# Patient Record
Sex: FEMALE | Race: WHITE | NOT HISPANIC OR LATINO | Employment: OTHER | URBAN - METROPOLITAN AREA
[De-identification: names, ages, dates, MRNs, and addresses within clinical notes are randomized per-mention and may not be internally consistent; named-entity substitution may affect disease eponyms.]

---

## 2017-01-19 RX ORDER — SIMVASTATIN 40 MG/1
TABLET, FILM COATED ORAL
Qty: 30 TABLET | Refills: 5 | Status: SHIPPED | OUTPATIENT
Start: 2017-01-19 | End: 2017-07-06 | Stop reason: SDUPTHER

## 2017-02-14 ENCOUNTER — CLINICAL SUPPORT (OUTPATIENT)
Dept: INTERNAL MEDICINE | Facility: CLINIC | Age: 82
End: 2017-02-14
Payer: MEDICARE

## 2017-02-14 DIAGNOSIS — E78.5 HYPERLIPIDEMIA, UNSPECIFIED HYPERLIPIDEMIA TYPE: ICD-10-CM

## 2017-02-14 DIAGNOSIS — I10 ESSENTIAL HYPERTENSION: ICD-10-CM

## 2017-02-14 DIAGNOSIS — F41.9 ANXIETY DISORDER, UNSPECIFIED TYPE: ICD-10-CM

## 2017-02-14 DIAGNOSIS — E03.4 HYPOTHYROIDISM DUE TO ACQUIRED ATROPHY OF THYROID: ICD-10-CM

## 2017-02-14 DIAGNOSIS — F51.04 CHRONIC INSOMNIA: ICD-10-CM

## 2017-02-14 LAB
ALBUMIN SERPL BCP-MCNC: 3.2 G/DL
ALP SERPL-CCNC: 45 U/L
ALT SERPL W/O P-5'-P-CCNC: 16 U/L
ANION GAP SERPL CALC-SCNC: 7 MMOL/L
AST SERPL-CCNC: 21 U/L
BASOPHILS # BLD AUTO: 0.03 K/UL
BASOPHILS NFR BLD: 0.4 %
BILIRUB SERPL-MCNC: 0.8 MG/DL
BUN SERPL-MCNC: 27 MG/DL
CALCIUM SERPL-MCNC: 9.7 MG/DL
CHLORIDE SERPL-SCNC: 107 MMOL/L
CHOLEST/HDLC SERPL: 2.6 {RATIO}
CO2 SERPL-SCNC: 30 MMOL/L
CREAT SERPL-MCNC: 1.3 MG/DL
DIFFERENTIAL METHOD: ABNORMAL
EOSINOPHIL # BLD AUTO: 0.2 K/UL
EOSINOPHIL NFR BLD: 2.8 %
ERYTHROCYTE [DISTWIDTH] IN BLOOD BY AUTOMATED COUNT: 13.1 %
EST. GFR  (AFRICAN AMERICAN): 42 ML/MIN/1.73 M^2
EST. GFR  (NON AFRICAN AMERICAN): 37 ML/MIN/1.73 M^2
GLUCOSE SERPL-MCNC: 92 MG/DL
HCT VFR BLD AUTO: 36.6 %
HDL/CHOLESTEROL RATIO: 39 %
HDLC SERPL-MCNC: 123 MG/DL
HDLC SERPL-MCNC: 48 MG/DL
HGB BLD-MCNC: 12 G/DL
LDLC SERPL CALC-MCNC: 60.6 MG/DL
LYMPHOCYTES # BLD AUTO: 1.8 K/UL
LYMPHOCYTES NFR BLD: 22.5 %
MCH RBC QN AUTO: 32.3 PG
MCHC RBC AUTO-ENTMCNC: 32.8 %
MCV RBC AUTO: 98 FL
MONOCYTES # BLD AUTO: 0.8 K/UL
MONOCYTES NFR BLD: 9.6 %
NEUTROPHILS # BLD AUTO: 5 K/UL
NEUTROPHILS NFR BLD: 64.7 %
NONHDLC SERPL-MCNC: 75 MG/DL
PLATELET # BLD AUTO: 216 K/UL
PMV BLD AUTO: 9.4 FL
POTASSIUM SERPL-SCNC: 4 MMOL/L
PROT SERPL-MCNC: 6.8 G/DL
RBC # BLD AUTO: 3.72 M/UL
SODIUM SERPL-SCNC: 144 MMOL/L
TRIGL SERPL-MCNC: 72 MG/DL
TSH SERPL DL<=0.005 MIU/L-ACNC: 1.71 UIU/ML
WBC # BLD AUTO: 7.79 K/UL

## 2017-02-14 PROCEDURE — 84443 ASSAY THYROID STIM HORMONE: CPT

## 2017-02-14 PROCEDURE — 80053 COMPREHEN METABOLIC PANEL: CPT

## 2017-02-14 PROCEDURE — 36415 COLL VENOUS BLD VENIPUNCTURE: CPT | Mod: PBBFAC

## 2017-02-14 PROCEDURE — 85025 COMPLETE CBC W/AUTO DIFF WBC: CPT

## 2017-02-14 PROCEDURE — 80061 LIPID PANEL: CPT

## 2017-02-17 RX ORDER — LISINOPRIL 10 MG/1
TABLET ORAL
Qty: 60 TABLET | Refills: 5 | Status: SHIPPED | OUTPATIENT
Start: 2017-02-17 | End: 2019-03-20 | Stop reason: SDUPTHER

## 2017-03-02 ENCOUNTER — OFFICE VISIT (OUTPATIENT)
Dept: INTERNAL MEDICINE | Facility: CLINIC | Age: 82
End: 2017-03-02
Payer: MEDICARE

## 2017-03-02 VITALS
SYSTOLIC BLOOD PRESSURE: 126 MMHG | HEIGHT: 60 IN | RESPIRATION RATE: 16 BRPM | HEART RATE: 64 BPM | OXYGEN SATURATION: 97 % | WEIGHT: 156.75 LBS | BODY MASS INDEX: 30.77 KG/M2 | DIASTOLIC BLOOD PRESSURE: 64 MMHG

## 2017-03-02 DIAGNOSIS — I10 ESSENTIAL HYPERTENSION: Primary | ICD-10-CM

## 2017-03-02 DIAGNOSIS — F51.04 CHRONIC INSOMNIA: ICD-10-CM

## 2017-03-02 DIAGNOSIS — F41.9 ANXIETY DISORDER, UNSPECIFIED TYPE: ICD-10-CM

## 2017-03-02 DIAGNOSIS — E78.5 HYPERLIPIDEMIA, UNSPECIFIED HYPERLIPIDEMIA TYPE: ICD-10-CM

## 2017-03-02 DIAGNOSIS — E03.4 HYPOTHYROIDISM DUE TO ACQUIRED ATROPHY OF THYROID: ICD-10-CM

## 2017-03-02 PROCEDURE — 99214 OFFICE O/P EST MOD 30 MIN: CPT | Mod: S$PBB | Performed by: INTERNAL MEDICINE

## 2017-03-02 PROCEDURE — 99999 PR PBB SHADOW E&M-EST. PATIENT-LVL III: CPT | Mod: PBBFAC,,, | Performed by: INTERNAL MEDICINE

## 2017-03-02 PROCEDURE — 1160F RVW MEDS BY RX/DR IN RCRD: CPT | Performed by: INTERNAL MEDICINE

## 2017-03-02 PROCEDURE — 1157F ADVNC CARE PLAN IN RCRD: CPT | Performed by: INTERNAL MEDICINE

## 2017-03-02 PROCEDURE — 99213 OFFICE O/P EST LOW 20 MIN: CPT | Mod: PBBFAC | Performed by: INTERNAL MEDICINE

## 2017-03-02 PROCEDURE — 1159F MED LIST DOCD IN RCRD: CPT | Performed by: INTERNAL MEDICINE

## 2017-03-02 RX ORDER — AMLODIPINE BESYLATE 2.5 MG/1
1 TABLET ORAL DAILY
COMMUNITY
Start: 2017-02-10 | End: 2018-09-19 | Stop reason: SDUPTHER

## 2017-03-02 NOTE — PROGRESS NOTES
Subjective:       Patient ID: Chanda Kaufman is a 88 y.o. female.    Chief Complaint: Thyroid Problem (Routine 6 month f/u with lab review); Hypertension; Hyperlipidemia; and Anxiety    HPI Comments: Chanda Kaufman is a 88 y.o. female who presents for thyroid issues , Hypertension, and Hyperlipidemia follow up. Labs were reviewed with patient today.    Thyroid Problem   Symptoms include anxiety. Patient reports no palpitations. Her past medical history is significant for hyperlipidemia.   Hypertension   This is a chronic problem. The problem is controlled. Associated symptoms include anxiety. Pertinent negatives include no chest pain, neck pain, palpitations or shortness of breath. Past treatments include diuretics, ACE inhibitors and beta blockers. The current treatment provides moderate improvement. Hypertensive end-organ damage includes a thyroid problem.   Hyperlipidemia   This is a chronic problem. The problem is controlled. Recent lipid tests were reviewed and are low. Exacerbating diseases include hypothyroidism and obesity. Pertinent negatives include no chest pain, myalgias or shortness of breath. Current antihyperlipidemic treatment includes statins. The current treatment provides significant improvement of lipids.   Anxiety   Presents for follow-up visit. Symptoms include excessive worry, insomnia, muscle tension, nervous/anxious behavior and restlessness. Patient reports no chest pain, confusion, dizziness, nausea, palpitations, shortness of breath or suicidal ideas. Symptoms occur most days. The severity of symptoms is severe.       Medication Refill   Associated symptoms include arthralgias and joint swelling. Pertinent negatives include no chest pain, chills, congestion, coughing, fever, myalgias, nausea, neck pain, numbness, sore throat or vomiting.     Review of Systems   Constitutional: Negative for chills and fever.   HENT: Negative for congestion, hearing loss, sinus pressure and sore throat.     Eyes: Negative for photophobia.   Respiratory: Negative for cough, choking, chest tightness, shortness of breath and wheezing.    Cardiovascular: Negative for chest pain and palpitations.   Gastrointestinal: Negative for blood in stool, nausea and vomiting.   Genitourinary: Negative for dysuria and hematuria.   Musculoskeletal: Positive for arthralgias, gait problem and joint swelling. Negative for myalgias and neck pain.   Skin: Negative for pallor.   Neurological: Negative for dizziness and numbness.   Hematological: Does not bruise/bleed easily.   Psychiatric/Behavioral: Positive for sleep disturbance. Negative for confusion and suicidal ideas. The patient is nervous/anxious and has insomnia.        Objective:      Physical Exam   Constitutional: She is oriented to person, place, and time. She appears well-developed and well-nourished.   HENT:   Head: Normocephalic and atraumatic.   Right Ear: External ear normal.   Left Ear: External ear normal.   Mouth/Throat: Oropharynx is clear and moist.   Eyes: Conjunctivae and EOM are normal. Pupils are equal, round, and reactive to light.   Neck: Normal range of motion. Neck supple. No JVD present. No tracheal deviation present. No thyromegaly present.   Cardiovascular: Normal rate, regular rhythm, normal heart sounds and intact distal pulses.    Pulmonary/Chest: Effort normal and breath sounds normal. No respiratory distress. She has no wheezes. She has no rales. She exhibits no tenderness.   Abdominal: Soft. Bowel sounds are normal. She exhibits no distension and no mass. There is no tenderness. There is no rebound and no guarding.   Musculoskeletal: Normal range of motion. She exhibits no edema.   Lymphadenopathy:     She has no cervical adenopathy.   Neurological: She is alert and oriented to person, place, and time. She has normal reflexes. No cranial nerve deficit. She exhibits normal muscle tone. Coordination normal.   Skin: Skin is warm and dry.   Psychiatric:  She has a normal mood and affect.   Nursing note and vitals reviewed.      Assessment:       1. Essential hypertension    2. Hypothyroidism due to acquired atrophy of thyroid    3. Hyperlipidemia, unspecified hyperlipidemia type    4. Anxiety disorder, unspecified type    5. Chronic insomnia        Plan:   Chanda was seen today for thyroid problem, hypertension, hyperlipidemia and anxiety.    Diagnoses and all orders for this visit:    Essential hypertension  -     CBC auto differential; Future  -     Comprehensive metabolic panel; Future    Well controlled.  Continue same medication and dose.  1. Keep weight close to ideal body weight.   2.   Avoid high salt foods (olives, pickles, smoked meats, salted potato chips, etc.).   Do not add salt to your food at the table.   Use only small amounts of salt when cooking.   3. Begin an exercise program. Discuss with your doctor what type of exercise program would be best for you. It doesn't have to be difficult. Even brisk walking for 20 minutes three times a week is a good form of exercise.   4. Avoid medicines which contain heart stimulants. This includes many cold and sinus decongestant pills and sprays as well as diet pills. Check the warnings about hypertension on the label. Stimulants such as amphetamine or cocaine could be lethal for someone with hypertension. Never take these.    Hypothyroidism due to acquired atrophy of thyroid  -     TSH; Future  Well controlled.  Continue same medication and dose.  Hyperlipidemia, unspecified hyperlipidemia type  -     Lipid panel; Future  -     TSH; Future  Well controlled.  Continue same medication and dose.  Anxiety disorder, unspecified type  -     TSH; Future  stable on xanax    Chronic insomnia  -     TSH; Future

## 2017-03-02 NOTE — MR AVS SNAPSHOT
[facility]  [address] [facility] [address]  Phone: [phone]  Fax: [phone]                  Chanda Kaufman   3/2/2017 11:15 AM   Office Visit    Description:  Female : 1928   Provider:  Lucina Carter MD   Department:  [facility]           Reason for Visit     Thyroid Problem     Hypertension     Hyperlipidemia     Anxiety           Diagnoses this Visit        Comments    Essential hypertension    -  Primary     Hypothyroidism due to acquired atrophy of thyroid         Hyperlipidemia, unspecified hyperlipidemia type         Anxiety disorder, unspecified type         Chronic insomnia                To Do List           Future Appointments        Provider Department Dept Phone    3/2/2017 11:15 AM Lucina Carter MD [facility] [phone]      Goals (5 Years of Data)     None      Follow-Up and Disposition     Return in about 6 months (around 2017).      [facility]     [facility] -  Assistance  Registered nurses in the [facility] Center provide clinical advisement, health education, appointment booking, and other advisory services.  Call for this free service at [phone].             Medications           Message regarding Medications     Verify the changes and/or additions to your medication regime listed below are the same as discussed with your clinician today.  If any of these changes or additions are incorrect, please notify your healthcare provider.             Verify that the below list of medications is an accurate representation of the medications you are currently taking.  If none reported, the list may be blank. If incorrect, please contact your healthcare provider. Carry this list with you in case of emergency.           Current Medications     alprazolam (XANAX) 0.5 MG tablet Take 1 tablet (0.5 mg total) by mouth 2 (two) times daily as needed.    amlodipine (NORVASC) 2.5 MG  tablet Take 1 tablet by mouth once daily.    cilostazol (PLETAL) 100 MG Tab 100 mg 2 (two) times daily.     clopidogrel (PLAVIX) 75 mg tablet Take 75 mg by mouth once daily.     doxazosin (CARDURA) 4 MG tablet Take 4 mg by mouth once daily.    gabapentin (NEURONTIN) 300 MG capsule Take 300 mg by mouth once daily.     hydrochlorothiazide (HYDRODIURIL) 25 MG tablet Take 1 tablet (25 mg total) by mouth once daily.    levothyroxine (SYNTHROID) 50 MCG tablet Take 1 tablet (50 mcg total) by mouth once daily.    lisinopril 10 MG tablet TAKE ONE TABLET BY MOUTH TWICE A DAY    meclizine (ANTIVERT) 25 mg tablet Take 25 mg by mouth 3 (three) times daily as needed.    metoprolol tartrate (LOPRESSOR) 100 MG tablet TAKE ONE TABLET BY MOUTH TWICE A DAY    MV-MIN/FA/D3/OM-3/DHA/EPA/FISH (CARDIAMIN ORAL) Take 1 tablet by mouth once daily.      POLY-IRON 150 FORTE 150-25-1 mg-mcg-mg Cap TAKE ONE CAPSULE BY MOUTH EVERY DAY    simvastatin (ZOCOR) 40 MG tablet TAKE ONE TABLET BY MOUTH AT BEDTIME           Clinical Reference Information           Your Vitals Were     BP                   126/64           Blood Pressure          Most Recent Value    BP  126/64      Allergies as of 3/2/2017     Penicillins      Immunizations Administered on Date of Encounter - 3/2/2017     None      Orders Placed During Today's Visit     Future Labs/Procedures Expected by Expires    CBC auto differential  8/29/2017 (Approximate) 3/2/2018    Comprehensive metabolic panel  8/29/2017 (Approximate) 3/2/2018    Lipid panel  8/29/2017 (Approximate) 3/2/2018    TSH  8/29/2017 (Approximate) 3/2/2018      [facility] Sign-Up     Activating your [facility] account is as easy as 1-2-3!     1) Visit [email or web address], select Sign Up Now, enter this activation code and your date of birth, then select Next.  Activation code not generated  Current Patient Portal Status: Account disabled      2) Create a username and password to use when you visit [facility] in the future and  select a security question in case you lose your password and select Next.    3) Enter your e-mail address and click Sign Up!    Additional Information  If you have questions, please e-mail [email or web address] or call [phone] to talk to our [facility] staff. Remember, [facility] is NOT to be used for urgent needs. For medical emergencies, dial 911.         Language Assistance Services     ATTENTION: Language assistance services are available, free of charge. Please call [phone].      ATENCIÓN: Si habla español, tiene a mosquera disposición servicios gratuitos de asistencia lingüística. Llame al [phone].     CHÚ Ý: N?u b?n nói Ti?ng Vi?t, có các d?ch v? h? tr? ngôn ng? mi?n phí dành cho b?n. G?i s? [phone].         [facility] complies with applicable Federal civil rights laws and does not discriminate on the basis of race, color, national origin, age, disability, or sex.

## 2017-06-12 ENCOUNTER — TELEPHONE (OUTPATIENT)
Dept: INTERNAL MEDICINE | Facility: CLINIC | Age: 82
End: 2017-06-12

## 2017-06-12 RX ORDER — ALPRAZOLAM 0.5 MG/1
0.5 TABLET ORAL 2 TIMES DAILY PRN
Qty: 60 TABLET | Refills: 5 | Status: SHIPPED | OUTPATIENT
Start: 2017-06-12 | End: 2017-12-05 | Stop reason: SDUPTHER

## 2017-07-10 RX ORDER — SIMVASTATIN 40 MG/1
TABLET, FILM COATED ORAL
Qty: 30 TABLET | Refills: 5 | Status: SHIPPED | OUTPATIENT
Start: 2017-07-10 | End: 2018-01-03 | Stop reason: SDUPTHER

## 2017-08-07 DIAGNOSIS — E03.4 HYPOTHYROIDISM DUE TO ACQUIRED ATROPHY OF THYROID: ICD-10-CM

## 2017-08-09 ENCOUNTER — TELEPHONE (OUTPATIENT)
Dept: INTERNAL MEDICINE | Facility: CLINIC | Age: 82
End: 2017-08-09

## 2017-08-09 RX ORDER — LEVOTHYROXINE SODIUM 50 UG/1
TABLET ORAL
Qty: 30 TABLET | Refills: 11 | Status: SHIPPED | OUTPATIENT
Start: 2017-08-09 | End: 2018-07-31 | Stop reason: SDUPTHER

## 2017-08-09 RX ORDER — METOPROLOL TARTRATE 100 MG/1
TABLET ORAL
Qty: 60 TABLET | Refills: 5 | Status: SHIPPED | OUTPATIENT
Start: 2017-08-09 | End: 2018-02-01 | Stop reason: SDUPTHER

## 2017-08-22 ENCOUNTER — TELEPHONE (OUTPATIENT)
Dept: INTERNAL MEDICINE | Facility: CLINIC | Age: 82
End: 2017-08-22

## 2017-08-22 RX ORDER — TORSEMIDE 5 MG/1
10 TABLET ORAL DAILY
COMMUNITY
End: 2018-09-19 | Stop reason: SDUPTHER

## 2017-08-22 NOTE — TELEPHONE ENCOUNTER
Lab results dated 8/8/17 received from Dr. Monterroso & reviewed per Dr. Carter (scanned into media). Orders per Dr. Carter to d/c HCTZ and repeat BMP in 1 week. Spoke to patient's granddaughter, Edie who states that Dr. Monterroso has already addressed these labs. HCTZ has already been d/c'ed and Torsemide 5 mg daily has been started. Patient's lisinopril has been decreased to 5 mg daily. Patient had labs repeated today per Dr. Monterroso.

## 2017-09-12 ENCOUNTER — OFFICE VISIT (OUTPATIENT)
Dept: INTERNAL MEDICINE | Facility: CLINIC | Age: 82
End: 2017-09-12
Payer: MEDICARE

## 2017-09-12 VITALS
WEIGHT: 153.44 LBS | HEART RATE: 69 BPM | BODY MASS INDEX: 30.12 KG/M2 | SYSTOLIC BLOOD PRESSURE: 132 MMHG | DIASTOLIC BLOOD PRESSURE: 80 MMHG | OXYGEN SATURATION: 94 % | HEIGHT: 60 IN | RESPIRATION RATE: 14 BRPM

## 2017-09-12 DIAGNOSIS — F41.1 GENERALIZED ANXIETY DISORDER: ICD-10-CM

## 2017-09-12 DIAGNOSIS — F51.04 CHRONIC INSOMNIA: ICD-10-CM

## 2017-09-12 DIAGNOSIS — I10 ESSENTIAL HYPERTENSION: Primary | ICD-10-CM

## 2017-09-12 DIAGNOSIS — E03.4 HYPOTHYROIDISM DUE TO ACQUIRED ATROPHY OF THYROID: ICD-10-CM

## 2017-09-12 DIAGNOSIS — E78.2 MIXED HYPERLIPIDEMIA: ICD-10-CM

## 2017-09-12 PROCEDURE — 99214 OFFICE O/P EST MOD 30 MIN: CPT | Mod: S$PBB | Performed by: INTERNAL MEDICINE

## 2017-09-12 PROCEDURE — 99999 PR PBB SHADOW E&M-EST. PATIENT-LVL III: CPT | Mod: PBBFAC,,, | Performed by: INTERNAL MEDICINE

## 2017-09-12 PROCEDURE — 99999 PR STA SHADOW: CPT | Mod: PBBFAC,,, | Performed by: INTERNAL MEDICINE

## 2017-09-12 PROCEDURE — 99213 OFFICE O/P EST LOW 20 MIN: CPT | Mod: PBBFAC | Performed by: INTERNAL MEDICINE

## 2017-09-12 RX ORDER — ASPIRIN 81 MG/1
81 TABLET ORAL DAILY
Status: ON HOLD | COMMUNITY
End: 2019-01-01 | Stop reason: HOSPADM

## 2017-09-12 NOTE — PROGRESS NOTES
Subjective:       Patient ID: Chanda Kaufman is a 89 y.o. female.    Chief Complaint: Hyperlipidemia (follow up  (labs in Media)); Hypertension; Thyroid Problem; and Anxiety    Chanda Kaufman is a 89 y.o. female who presents for thyroid issues , Hypertension, and Hyperlipidemia follow up. Labs were reviewed with patient today.      She had labs done per DR Monterroso;  Her creatinine bumped; so her meds are changed.      Hyperlipidemia   This is a chronic problem. The problem is controlled. Recent lipid tests were reviewed and are low. Exacerbating diseases include hypothyroidism and obesity. Pertinent negatives include no chest pain, myalgias or shortness of breath. Current antihyperlipidemic treatment includes statins. The current treatment provides significant improvement of lipids.   Hypertension   This is a chronic problem. The problem is controlled. Associated symptoms include anxiety. Pertinent negatives include no chest pain, neck pain, palpitations or shortness of breath. Past treatments include diuretics, ACE inhibitors and beta blockers. The current treatment provides moderate improvement. Hypertensive end-organ damage includes a thyroid problem.   Thyroid Problem   Symptoms include anxiety. Patient reports no palpitations. Her past medical history is significant for hyperlipidemia.   Anxiety   Presents for follow-up visit. Symptoms include excessive worry, insomnia, muscle tension, nervous/anxious behavior and restlessness. Patient reports no chest pain, confusion, dizziness, nausea, palpitations, shortness of breath or suicidal ideas. Symptoms occur most days. The severity of symptoms is severe.       Medication Refill   Associated symptoms include arthralgias and joint swelling. Pertinent negatives include no chest pain, chills, congestion, coughing, fever, myalgias, nausea, neck pain, numbness, sore throat or vomiting.     Review of Systems   Constitutional: Negative for chills and fever.   HENT: Negative  for congestion, hearing loss, sinus pressure and sore throat.    Eyes: Negative for photophobia.   Respiratory: Negative for cough, choking, chest tightness, shortness of breath and wheezing.    Cardiovascular: Negative for chest pain and palpitations.   Gastrointestinal: Negative for blood in stool, nausea and vomiting.   Genitourinary: Negative for dysuria and hematuria.   Musculoskeletal: Positive for arthralgias, gait problem and joint swelling. Negative for myalgias and neck pain.   Skin: Negative for pallor.   Neurological: Negative for dizziness and numbness.   Hematological: Does not bruise/bleed easily.   Psychiatric/Behavioral: Positive for sleep disturbance. Negative for confusion and suicidal ideas. The patient is nervous/anxious and has insomnia.        Objective:      Physical Exam   Constitutional: She is oriented to person, place, and time. She appears well-developed and well-nourished.   HENT:   Head: Normocephalic and atraumatic.   Right Ear: External ear normal.   Left Ear: External ear normal.   Mouth/Throat: Oropharynx is clear and moist.   Eyes: Conjunctivae and EOM are normal. Pupils are equal, round, and reactive to light.   Neck: Normal range of motion. Neck supple. No JVD present. No tracheal deviation present. No thyromegaly present.   Cardiovascular: Normal rate, regular rhythm, normal heart sounds and intact distal pulses.    Pulmonary/Chest: Effort normal and breath sounds normal. No respiratory distress. She has no wheezes. She has no rales. She exhibits no tenderness.   Abdominal: Soft. Bowel sounds are normal. She exhibits no distension and no mass. There is no tenderness. There is no rebound and no guarding.   Musculoskeletal: Normal range of motion. She exhibits no edema.   Lymphadenopathy:     She has no cervical adenopathy.   Neurological: She is alert and oriented to person, place, and time. She has normal reflexes. No cranial nerve deficit. She exhibits normal muscle tone.  Coordination normal.   Skin: Skin is warm and dry.   Psychiatric: She has a normal mood and affect.   Nursing note and vitals reviewed.      Assessment:       1. Essential hypertension    2. Mixed hyperlipidemia    3. Hypothyroidism due to acquired atrophy of thyroid    4. Generalized anxiety disorder    5. Chronic insomnia        Plan:   Chanda was seen today for hyperlipidemia, hypertension, thyroid problem and anxiety.    Diagnoses and all orders for this visit:    Essential hypertension  -     CBC auto differential; Future  -     Comprehensive metabolic panel; Future    Well controlled.  Continue same medication and dose.  1. Keep weight close to ideal body weight.   2.   Avoid high salt foods (olives, pickles, smoked meats, salted potato chips, etc.).   Do not add salt to your food at the table.   Use only small amounts of salt when cooking.   3. Begin an exercise program. Discuss with your doctor what type of exercise program would be best for you. It doesn't have to be difficult. Even brisk walking for 20 minutes three times a week is a good form of exercise.   4. Avoid medicines which contain heart stimulants. This includes many cold and sinus decongestant pills and sprays as well as diet pills. Check the warnings about hypertension on the label. Stimulants such as amphetamine or cocaine could be lethal for someone with hypertension. Never take these.    Mixed hyperlipidemia  -     Lipid panel; Future  -     TSH; Future  Limit the cholesterol in your diet to less than 300 mg per day.   Fats should contribute no more than 20 to 35% of your daily calories.   Less than 7 to 10% of your calories should come from saturated fat.   Avoid saturated fat products e.g., Butter, some oils, meat, and poultry fat contain a lot of saturated fat.   Check food labels for fat and cholesterol content. Choose the foods with less fat per serving.   Limit the amount of butter and margarine you eat.   Use salad dressings and  margarine made with polyunsaturated and monounsaturated fats.   Use egg whites or egg substitutes rather than whole eggs.   Replace whole-milk dairy products with nonfat or low-fat milk, cheese, spreads, and yogurt.   Eat skinless chicken, turkey, fish, and meatless entrees more often than red meat.   Choose lean cuts of meat and trim off all visible fat. Keep portion sizes moderate.   Avoid fatty desserts such as ice cream, cream-filled cakes, and cheesecakes. Choose fresh fruits, nonfat frozen yogurt, Popsicles, etc.   Reduce the amount of fried foods, vending machine food, and fast food you eat.   Eat fruits and vegetables (especially fresh fruits and leafy vegetables), beans, and whole grains daily. The fiber in these foods helps lower cholesterol.   Look for low-fat or nonfat varieties of the foods you like to eat, or look for substitutes.   You may need to exercise 60 minutes a day to prevent weight gain and 90 minutes a day to lose weight.  Hypothyroidism due to acquired atrophy of thyroid  -     TSH; Future  The current medical regimen is effective;  continue present plan and medications.    Generalized anxiety disorder  -     TSH; Future  The current medical regimen is effective;  continue present plan and medications.  Chronic insomnia  -     TSH; Future  Stable.

## 2017-11-21 ENCOUNTER — HOSPITAL ENCOUNTER (OUTPATIENT)
Dept: RADIOLOGY | Facility: HOSPITAL | Age: 82
Discharge: HOME | End: 2017-11-21
Attending: SURGERY
Payer: MEDICARE

## 2017-11-21 VITALS — BODY MASS INDEX: 30.04 KG/M2 | WEIGHT: 153 LBS | HEIGHT: 60 IN

## 2017-11-21 DIAGNOSIS — Z85.3 HISTORY OF BREAST CANCER: ICD-10-CM

## 2017-11-21 PROCEDURE — 77065 DX MAMMO INCL CAD UNI: CPT | Mod: 26,LT,, | Performed by: RADIOLOGY

## 2017-11-21 PROCEDURE — 77061 BREAST TOMOSYNTHESIS UNI: CPT | Mod: TC,LT

## 2017-11-21 PROCEDURE — 77061 BREAST TOMOSYNTHESIS UNI: CPT | Mod: 26,LT,, | Performed by: RADIOLOGY

## 2017-12-06 RX ORDER — ALPRAZOLAM 0.5 MG/1
0.5 TABLET ORAL 2 TIMES DAILY PRN
Qty: 60 TABLET | Refills: 5 | Status: SHIPPED | OUTPATIENT
Start: 2017-12-06 | End: 2018-05-30 | Stop reason: SDUPTHER

## 2018-01-03 RX ORDER — SIMVASTATIN 40 MG/1
TABLET, FILM COATED ORAL
Qty: 30 TABLET | Refills: 5 | Status: SHIPPED | OUTPATIENT
Start: 2018-01-03 | End: 2018-06-25 | Stop reason: SDUPTHER

## 2018-02-05 RX ORDER — METOPROLOL TARTRATE 100 MG/1
TABLET ORAL
Qty: 60 TABLET | Refills: 5 | Status: SHIPPED | OUTPATIENT
Start: 2018-02-05 | End: 2018-07-31 | Stop reason: SDUPTHER

## 2018-03-07 ENCOUNTER — CLINICAL SUPPORT (OUTPATIENT)
Dept: INTERNAL MEDICINE | Facility: CLINIC | Age: 83
End: 2018-03-07
Payer: MEDICARE

## 2018-03-07 DIAGNOSIS — E03.4 HYPOTHYROIDISM DUE TO ACQUIRED ATROPHY OF THYROID: ICD-10-CM

## 2018-03-07 DIAGNOSIS — E78.2 MIXED HYPERLIPIDEMIA: ICD-10-CM

## 2018-03-07 DIAGNOSIS — F41.1 GENERALIZED ANXIETY DISORDER: ICD-10-CM

## 2018-03-07 DIAGNOSIS — I10 ESSENTIAL HYPERTENSION: ICD-10-CM

## 2018-03-07 DIAGNOSIS — F51.04 CHRONIC INSOMNIA: ICD-10-CM

## 2018-03-07 LAB
ALBUMIN SERPL BCP-MCNC: 3.7 G/DL
ALP SERPL-CCNC: 48 U/L
ALT SERPL W/O P-5'-P-CCNC: 16 U/L
ANION GAP SERPL CALC-SCNC: 7 MMOL/L
AST SERPL-CCNC: 21 U/L
BASOPHILS # BLD AUTO: 0.02 K/UL
BASOPHILS NFR BLD: 0.3 %
BILIRUB SERPL-MCNC: 0.7 MG/DL
BUN SERPL-MCNC: 25 MG/DL
CALCIUM SERPL-MCNC: 10.2 MG/DL
CHLORIDE SERPL-SCNC: 104 MMOL/L
CHOLEST SERPL-MCNC: 149 MG/DL
CHOLEST/HDLC SERPL: 2.9 {RATIO}
CO2 SERPL-SCNC: 33 MMOL/L
CREAT SERPL-MCNC: 1.3 MG/DL
DIFFERENTIAL METHOD: ABNORMAL
EOSINOPHIL # BLD AUTO: 0.2 K/UL
EOSINOPHIL NFR BLD: 2.7 %
ERYTHROCYTE [DISTWIDTH] IN BLOOD BY AUTOMATED COUNT: 13.1 %
EST. GFR  (AFRICAN AMERICAN): 42 ML/MIN/1.73 M^2
EST. GFR  (NON AFRICAN AMERICAN): 36 ML/MIN/1.73 M^2
GLUCOSE SERPL-MCNC: 97 MG/DL
HCT VFR BLD AUTO: 40 %
HDLC SERPL-MCNC: 52 MG/DL
HDLC SERPL: 34.9 %
HGB BLD-MCNC: 13.4 G/DL
LDLC SERPL CALC-MCNC: 79.8 MG/DL
LYMPHOCYTES # BLD AUTO: 1.7 K/UL
LYMPHOCYTES NFR BLD: 24.9 %
MCH RBC QN AUTO: 33.5 PG
MCHC RBC AUTO-ENTMCNC: 33.5 G/DL
MCV RBC AUTO: 100 FL
MONOCYTES # BLD AUTO: 0.7 K/UL
MONOCYTES NFR BLD: 10.8 %
NEUTROPHILS # BLD AUTO: 4.2 K/UL
NEUTROPHILS NFR BLD: 61.3 %
NONHDLC SERPL-MCNC: 97 MG/DL
PLATELET # BLD AUTO: 258 K/UL
PMV BLD AUTO: 9.4 FL
POTASSIUM SERPL-SCNC: 4 MMOL/L
PROT SERPL-MCNC: 7.6 G/DL
RBC # BLD AUTO: 4 M/UL
SODIUM SERPL-SCNC: 144 MMOL/L
TRIGL SERPL-MCNC: 86 MG/DL
TSH SERPL DL<=0.005 MIU/L-ACNC: 1.73 UIU/ML
WBC # BLD AUTO: 6.76 K/UL

## 2018-03-07 PROCEDURE — 84443 ASSAY THYROID STIM HORMONE: CPT

## 2018-03-07 PROCEDURE — 99999 PR STA SHADOW: CPT | Mod: PBBFAC,,,

## 2018-03-07 PROCEDURE — 80061 LIPID PANEL: CPT

## 2018-03-07 PROCEDURE — 80053 COMPREHEN METABOLIC PANEL: CPT

## 2018-03-07 PROCEDURE — 99211 OFF/OP EST MAY X REQ PHY/QHP: CPT | Mod: PBBFAC

## 2018-03-07 PROCEDURE — 85025 COMPLETE CBC W/AUTO DIFF WBC: CPT

## 2018-03-07 PROCEDURE — 36415 COLL VENOUS BLD VENIPUNCTURE: CPT | Mod: PBBFAC

## 2018-03-07 PROCEDURE — 99999 PR PBB SHADOW E&M-EST. PATIENT-LVL I: CPT | Mod: PBBFAC,,,

## 2018-03-14 ENCOUNTER — OFFICE VISIT (OUTPATIENT)
Dept: INTERNAL MEDICINE | Facility: CLINIC | Age: 83
End: 2018-03-14
Payer: MEDICARE

## 2018-03-14 VITALS
BODY MASS INDEX: 29.56 KG/M2 | DIASTOLIC BLOOD PRESSURE: 60 MMHG | HEIGHT: 60 IN | RESPIRATION RATE: 14 BRPM | WEIGHT: 150.56 LBS | SYSTOLIC BLOOD PRESSURE: 130 MMHG | HEART RATE: 70 BPM | OXYGEN SATURATION: 98 %

## 2018-03-14 DIAGNOSIS — E03.4 HYPOTHYROIDISM DUE TO ACQUIRED ATROPHY OF THYROID: ICD-10-CM

## 2018-03-14 DIAGNOSIS — E78.2 MIXED HYPERLIPIDEMIA: ICD-10-CM

## 2018-03-14 DIAGNOSIS — F51.04 CHRONIC INSOMNIA: ICD-10-CM

## 2018-03-14 DIAGNOSIS — I10 ESSENTIAL HYPERTENSION: Primary | ICD-10-CM

## 2018-03-14 DIAGNOSIS — F41.1 GENERALIZED ANXIETY DISORDER: ICD-10-CM

## 2018-03-14 PROCEDURE — 90662 IIV NO PRSV INCREASED AG IM: CPT | Mod: PBBFAC

## 2018-03-14 PROCEDURE — 99999 FLU VACCINE - HIGH DOSE (65+) PRESERVATIVE FREE IM: CPT | Mod: PBBFAC,,,

## 2018-03-14 PROCEDURE — 99999 PR STA SHADOW: CPT | Mod: PBBFAC,,, | Performed by: INTERNAL MEDICINE

## 2018-03-14 PROCEDURE — 99999 PR PBB SHADOW E&M-EST. PATIENT-LVL III: CPT | Mod: PBBFAC,,, | Performed by: INTERNAL MEDICINE

## 2018-03-14 PROCEDURE — 99214 OFFICE O/P EST MOD 30 MIN: CPT | Mod: S$PBB | Performed by: INTERNAL MEDICINE

## 2018-03-14 PROCEDURE — 99213 OFFICE O/P EST LOW 20 MIN: CPT | Mod: PBBFAC | Performed by: INTERNAL MEDICINE

## 2018-03-14 NOTE — PROGRESS NOTES
Subjective:       Patient ID: Chanda Kaufman is a 89 y.o. female.    Chief Complaint: Hyperlipidemia (FOLLOW UP WITH LAB REVIEW); Hypertension; Anxiety; and Arm Pain    Chanda Kaufman is a 89 y.o. female who presents for thyroid issues , Hypertension, and Hyperlipidemia follow up. Labs were reviewed with patient today.      She had labs done per DR Monterroso;  Her creatinine bumped; so her meds are changed.      Hyperlipidemia   This is a chronic problem. The problem is controlled. Recent lipid tests were reviewed and are low. Exacerbating diseases include hypothyroidism and obesity. Pertinent negatives include no chest pain, myalgias or shortness of breath. Current antihyperlipidemic treatment includes statins. The current treatment provides significant improvement of lipids.   Hypertension   This is a chronic problem. The problem is controlled. Associated symptoms include anxiety. Pertinent negatives include no chest pain, neck pain, palpitations or shortness of breath. Past treatments include diuretics, ACE inhibitors and beta blockers. The current treatment provides moderate improvement. Identifiable causes of hypertension include a thyroid problem.   Anxiety   Presents for follow-up visit. Symptoms include excessive worry, insomnia, muscle tension, nervous/anxious behavior and restlessness. Patient reports no chest pain, confusion, dizziness, nausea, palpitations, shortness of breath or suicidal ideas. Symptoms occur most days. The severity of symptoms is severe.       Arm Pain    Pertinent negatives include no chest pain or numbness.   Thyroid Problem   Symptoms include anxiety. Patient reports no palpitations. Her past medical history is significant for hyperlipidemia.   Medication Refill   Associated symptoms include arthralgias and joint swelling. Pertinent negatives include no chest pain, chills, congestion, coughing, fever, myalgias, nausea, neck pain, numbness, sore throat or vomiting.     Review of Systems    Constitutional: Negative for chills and fever.   HENT: Negative for congestion, hearing loss, sinus pressure and sore throat.    Eyes: Negative for photophobia.   Respiratory: Negative for cough, choking, chest tightness, shortness of breath and wheezing.    Cardiovascular: Negative for chest pain and palpitations.   Gastrointestinal: Negative for blood in stool, nausea and vomiting.   Genitourinary: Negative for dysuria and hematuria.   Musculoskeletal: Positive for arthralgias, gait problem and joint swelling. Negative for myalgias and neck pain.   Skin: Negative for pallor.   Neurological: Negative for dizziness and numbness.   Hematological: Does not bruise/bleed easily.   Psychiatric/Behavioral: Positive for sleep disturbance. Negative for confusion and suicidal ideas. The patient is nervous/anxious and has insomnia.        Objective:      Physical Exam   Constitutional: She is oriented to person, place, and time. She appears well-developed and well-nourished.   HENT:   Head: Normocephalic and atraumatic.   Right Ear: External ear normal.   Left Ear: External ear normal.   Mouth/Throat: Oropharynx is clear and moist.   Eyes: Conjunctivae and EOM are normal. Pupils are equal, round, and reactive to light.   Neck: Normal range of motion. Neck supple. No JVD present. No tracheal deviation present. No thyromegaly present.   Cardiovascular: Normal rate, regular rhythm, normal heart sounds and intact distal pulses.    Pulmonary/Chest: Effort normal and breath sounds normal. No respiratory distress. She has no wheezes. She has no rales. She exhibits no tenderness.   Abdominal: Soft. Bowel sounds are normal. She exhibits no distension and no mass. There is no tenderness. There is no rebound and no guarding.   Musculoskeletal: Normal range of motion. She exhibits no edema.   Lymphadenopathy:     She has no cervical adenopathy.   Neurological: She is alert and oriented to person, place, and time. She has normal  reflexes. No cranial nerve deficit. She exhibits normal muscle tone. Coordination normal.   Skin: Skin is warm and dry.   Psychiatric: She has a normal mood and affect.   Nursing note and vitals reviewed.      Assessment:       1. Essential hypertension    2. Mixed hyperlipidemia    3. Hypothyroidism due to acquired atrophy of thyroid    4. Generalized anxiety disorder    5. Chronic insomnia        Plan:   Chanda was seen today for hyperlipidemia, hypertension, anxiety and arm pain.    Diagnoses and all orders for this visit:    Essential hypertension  -     CBC auto differential; Future  -     Comprehensive metabolic panel; Future    Well controlled.  Continue same medication and dose.  1. Keep weight close to ideal body weight.   2.   Avoid high salt foods (olives, pickles, smoked meats, salted potato chips, etc.).   Do not add salt to your food at the table.   Use only small amounts of salt when cooking.   3. Begin an exercise program. Discuss with your doctor what type of exercise program would be best for you. It doesn't have to be difficult. Even brisk walking for 20 minutes three times a week is a good form of exercise.   4. Avoid medicines which contain heart stimulants. This includes many cold and sinus decongestant pills and sprays as well as diet pills. Check the warnings about hypertension on the label. Stimulants such as amphetamine or cocaine could be lethal for someone with hypertension. Never take these.    Mixed hyperlipidemia  -     Lipid panel; Future  -     TSH; Future  Limit the cholesterol in your diet to less than 300 mg per day.   Fats should contribute no more than 20 to 35% of your daily calories.   Less than 7 to 10% of your calories should come from saturated fat.   Avoid saturated fat products e.g., Butter, some oils, meat, and poultry fat contain a lot of saturated fat.   Check food labels for fat and cholesterol content. Choose the foods with less fat per serving.   Limit the amount of  butter and margarine you eat.   Use salad dressings and margarine made with polyunsaturated and monounsaturated fats.   Use egg whites or egg substitutes rather than whole eggs.   Replace whole-milk dairy products with nonfat or low-fat milk, cheese, spreads, and yogurt.   Eat skinless chicken, turkey, fish, and meatless entrees more often than red meat.   Choose lean cuts of meat and trim off all visible fat. Keep portion sizes moderate.   Avoid fatty desserts such as ice cream, cream-filled cakes, and cheesecakes. Choose fresh fruits, nonfat frozen yogurt, Popsicles, etc.   Reduce the amount of fried foods, vending machine food, and fast food you eat.   Eat fruits and vegetables (especially fresh fruits and leafy vegetables), beans, and whole grains daily. The fiber in these foods helps lower cholesterol.   Look for low-fat or nonfat varieties of the foods you like to eat, or look for substitutes.   You may need to exercise 60 minutes a day to prevent weight gain and 90 minutes a day to lose weight.  Hypothyroidism due to acquired atrophy of thyroid  -     TSH; Future  Well controlled.  Continue same medication and dose.    Generalized anxiety disorder  -     TSH; Future  Xanax prn     Chronic insomnia  -     TSH; Future

## 2018-05-29 ENCOUNTER — TELEPHONE (OUTPATIENT)
Dept: INTERNAL MEDICINE | Facility: CLINIC | Age: 83
End: 2018-05-29

## 2018-05-29 RX ORDER — ALPRAZOLAM 0.5 MG/1
TABLET ORAL
Qty: 60 TABLET | Refills: 0 | Status: CANCELLED | OUTPATIENT
Start: 2018-05-29

## 2018-05-29 NOTE — TELEPHONE ENCOUNTER
----- Message from Teresa Dai sent at 2018 11:11 AM CDT -----  Contact: TONNY / DAUGHTER  Chanda Kaufman  MRN: [id number]  : 1928  PCP: Lucina Carter  Home Phone      [phone]  Work Phone      Not on file.  Mobile          [phone]      MESSAGE:   Pt requesting refill or new Rx.   Is this a refill or new RX:  REFILL  RX name and strength: XANAX  Pharm[facility] name and location:  [facility]    CALL WHEN READY    Phone:  [phone]

## 2018-05-30 RX ORDER — ALPRAZOLAM 0.5 MG/1
0.5 TABLET ORAL 2 TIMES DAILY PRN
Qty: 60 TABLET | Refills: 0 | Status: SHIPPED | OUTPATIENT
Start: 2018-05-30 | End: 2018-06-27 | Stop reason: SDUPTHER

## 2018-05-30 NOTE — TELEPHONE ENCOUNTER
Requested Prescriptions     Pending Prescriptions Disp Refills    ALPRAZolam (XANAX) 0.5 MG tablet 60 tablet 0     Sig: Take 1 tablet (0.5 mg total) by mouth 2 (two) times daily as needed.   Dr. Carter patient requesting refill before he returns to clinic on 6/4/18. LOV: 3/14/18. Please advise on this refill request. Thanks.

## 2018-05-30 NOTE — TELEPHONE ENCOUNTER
----- Message from Rosina Machuca sent at 2018  9:26 AM CDT -----  Contact: pt's daughter  Chanda Kaufman  MRN: [id number]  : 1928  PCP: Lucina Carter  Home Phone      [phone]  Work Phone      Not on file.  Mobile          [phone]      MESSAGE:  Pt is almost out of Xanax and will be out before Monday. Asking if another provider can fill it for her. Please advise.  PHONE:  [phone]  PHARMACY:  [facility]

## 2018-06-04 RX ORDER — ALPRAZOLAM 0.5 MG/1
TABLET ORAL
Qty: 60 TABLET | Refills: 0 | OUTPATIENT
Start: 2018-06-04

## 2018-06-25 RX ORDER — SIMVASTATIN 40 MG/1
TABLET, FILM COATED ORAL
Qty: 30 TABLET | Refills: 1 | Status: SHIPPED | OUTPATIENT
Start: 2018-06-25 | End: 2018-06-26 | Stop reason: SDUPTHER

## 2018-06-26 RX ORDER — SIMVASTATIN 40 MG/1
40 TABLET, FILM COATED ORAL NIGHTLY
Qty: 90 TABLET | Refills: 1 | Status: SHIPPED | OUTPATIENT
Start: 2018-06-26 | End: 2018-09-19 | Stop reason: SDUPTHER

## 2018-06-26 NOTE — TELEPHONE ENCOUNTER
Requested Prescriptions     Pending Prescriptions Disp Refills    simvastatin (ZOCOR) 40 MG tablet 90 tablet 1     Sig: Take 1 tablet (40 mg total) by mouth nightly.   Pharmacy requesting 90 day script to promote better adherence. Thanks.

## 2018-06-27 RX ORDER — ALPRAZOLAM 0.5 MG/1
0.5 TABLET ORAL 2 TIMES DAILY PRN
Qty: 60 TABLET | Refills: 0 | Status: SHIPPED | OUTPATIENT
Start: 2018-06-27 | End: 2018-07-27 | Stop reason: SDUPTHER

## 2018-06-27 NOTE — TELEPHONE ENCOUNTER
Patient requesting a 30-day medication refill on ALPRAZolam (XANAX) 0.5 MG tablet sent to [facility]. Last office visit on 03/14/2018. Please advise.

## 2018-07-01 RX ORDER — ALPRAZOLAM 0.5 MG/1
TABLET ORAL
Qty: 60 TABLET | Refills: 0 | OUTPATIENT
Start: 2018-07-01

## 2018-07-27 NOTE — TELEPHONE ENCOUNTER
----- Message from Teresa Dai sent at 2018  9:58 AM CDT -----  Chanda Kaufman  MRN: [id number]  : 1928  PCP: Lucina Carter  Home Phone      [phone]  Work Phone      Not on file.  Mobile          [phone]      MESSAGE: NEEDS REFILL ON [employer]    PHONE: [phone]    [facility]

## 2018-07-31 DIAGNOSIS — E03.4 HYPOTHYROIDISM DUE TO ACQUIRED ATROPHY OF THYROID: ICD-10-CM

## 2018-07-31 RX ORDER — ALPRAZOLAM 0.5 MG/1
0.5 TABLET ORAL 2 TIMES DAILY PRN
Qty: 60 TABLET | Refills: 0 | Status: SHIPPED | OUTPATIENT
Start: 2018-07-31 | End: 2018-08-30 | Stop reason: SDUPTHER

## 2018-07-31 RX ORDER — LEVOTHYROXINE SODIUM 50 UG/1
TABLET ORAL
Qty: 90 TABLET | Refills: 1 | Status: SHIPPED | OUTPATIENT
Start: 2018-07-31 | End: 2019-03-20 | Stop reason: SDUPTHER

## 2018-07-31 RX ORDER — METOPROLOL TARTRATE 100 MG/1
100 TABLET ORAL 2 TIMES DAILY
Qty: 180 TABLET | Refills: 1 | Status: SHIPPED | OUTPATIENT
Start: 2018-07-31 | End: 2018-09-19 | Stop reason: SDUPTHER

## 2018-07-31 RX ORDER — ALPRAZOLAM 0.5 MG/1
TABLET ORAL
Qty: 60 TABLET | Refills: 0 | OUTPATIENT
Start: 2018-07-31

## 2018-07-31 RX ORDER — METOPROLOL TARTRATE 100 MG/1
TABLET ORAL
Qty: 60 TABLET | Refills: 2 | Status: CANCELLED | OUTPATIENT
Start: 2018-07-31

## 2018-07-31 NOTE — TELEPHONE ENCOUNTER
----- Message from Teresa Dai sent at 2018  1:32 PM CDT -----  Contact: TONNY / DAUGHTER  Chanda Kaufamn  MRN: [id number]  : 1928  PCP: Lucina Carter  Home Phone      [phone]  Work Phone      Not on file.  Mobile          [phone]      MESSAGE: NEEDS REFILLS ON LEVOTHYROXINE AND METOPROLOL    PHONE: [phone]    PHARMACY: Pending sale to [facility]

## 2018-08-30 RX ORDER — ALPRAZOLAM 0.5 MG/1
0.5 TABLET ORAL 2 TIMES DAILY PRN
Qty: 60 TABLET | Refills: 0 | Status: SHIPPED | OUTPATIENT
Start: 2018-08-30 | End: 2018-09-19 | Stop reason: SDUPTHER

## 2018-08-30 NOTE — TELEPHONE ENCOUNTER
----- Message from Teresa Dai sent at 2018 10:38 AM CDT -----  Contact: TONNY / DAUGHTER  Chanda Kaufman  MRN: [id number]  : 1928  PCP: Lucina Carter  Home Phone      [phone]  Work Phone      Not on file.  Mobile          [phone]      MESSAGE: NEEDS REFILL ON [employer]    PHONE: [phone]    [facility]

## 2018-09-12 ENCOUNTER — CLINICAL SUPPORT (OUTPATIENT)
Dept: INTERNAL MEDICINE | Facility: CLINIC | Age: 83
End: 2018-09-12
Payer: MEDICARE

## 2018-09-12 DIAGNOSIS — E78.2 MIXED HYPERLIPIDEMIA: ICD-10-CM

## 2018-09-12 DIAGNOSIS — E03.4 HYPOTHYROIDISM DUE TO ACQUIRED ATROPHY OF THYROID: ICD-10-CM

## 2018-09-12 DIAGNOSIS — F41.1 GENERALIZED ANXIETY DISORDER: ICD-10-CM

## 2018-09-12 DIAGNOSIS — F51.04 CHRONIC INSOMNIA: ICD-10-CM

## 2018-09-12 DIAGNOSIS — I10 ESSENTIAL HYPERTENSION: ICD-10-CM

## 2018-09-12 LAB
ALBUMIN SERPL BCP-MCNC: 3.3 G/DL
ALP SERPL-CCNC: 41 U/L
ALT SERPL W/O P-5'-P-CCNC: 14 U/L
ANION GAP SERPL CALC-SCNC: 11 MMOL/L
AST SERPL-CCNC: 25 U/L
BASOPHILS # BLD AUTO: 0.02 K/UL
BASOPHILS NFR BLD: 0.3 %
BILIRUB SERPL-MCNC: 0.6 MG/DL
BUN SERPL-MCNC: 32 MG/DL
CALCIUM SERPL-MCNC: 9.8 MG/DL
CHLORIDE SERPL-SCNC: 105 MMOL/L
CHOLEST SERPL-MCNC: 121 MG/DL
CHOLEST/HDLC SERPL: 2.8 {RATIO}
CO2 SERPL-SCNC: 27 MMOL/L
CREAT SERPL-MCNC: 1.4 MG/DL
DIFFERENTIAL METHOD: ABNORMAL
EOSINOPHIL # BLD AUTO: 0.2 K/UL
EOSINOPHIL NFR BLD: 3.6 %
ERYTHROCYTE [DISTWIDTH] IN BLOOD BY AUTOMATED COUNT: 12.5 %
EST. GFR  (AFRICAN AMERICAN): 38 ML/MIN/1.73 M^2
EST. GFR  (NON AFRICAN AMERICAN): 33 ML/MIN/1.73 M^2
GLUCOSE SERPL-MCNC: 90 MG/DL
HCT VFR BLD AUTO: 39.7 %
HDLC SERPL-MCNC: 44 MG/DL
HDLC SERPL: 36.4 %
HGB BLD-MCNC: 12.7 G/DL
LDLC SERPL CALC-MCNC: 60.4 MG/DL
LYMPHOCYTES # BLD AUTO: 1.8 K/UL
LYMPHOCYTES NFR BLD: 27.3 %
MCH RBC QN AUTO: 32.5 PG
MCHC RBC AUTO-ENTMCNC: 32 G/DL
MCV RBC AUTO: 102 FL
MONOCYTES # BLD AUTO: 0.7 K/UL
MONOCYTES NFR BLD: 11.1 %
NEUTROPHILS # BLD AUTO: 3.8 K/UL
NEUTROPHILS NFR BLD: 57.7 %
NONHDLC SERPL-MCNC: 77 MG/DL
PLATELET # BLD AUTO: 241 K/UL
PMV BLD AUTO: 9.5 FL
POTASSIUM SERPL-SCNC: 4.2 MMOL/L
PROT SERPL-MCNC: 7.1 G/DL
RBC # BLD AUTO: 3.91 M/UL
SODIUM SERPL-SCNC: 143 MMOL/L
TRIGL SERPL-MCNC: 83 MG/DL
TSH SERPL DL<=0.005 MIU/L-ACNC: 2.54 UIU/ML
WBC # BLD AUTO: 6.6 K/UL

## 2018-09-12 PROCEDURE — 85025 COMPLETE CBC W/AUTO DIFF WBC: CPT

## 2018-09-12 PROCEDURE — 99999 PR STA SHADOW: CPT | Mod: PBBFAC,,,

## 2018-09-12 PROCEDURE — 36415 COLL VENOUS BLD VENIPUNCTURE: CPT | Mod: PBBFAC

## 2018-09-12 PROCEDURE — 80061 LIPID PANEL: CPT

## 2018-09-12 PROCEDURE — 80053 COMPREHEN METABOLIC PANEL: CPT

## 2018-09-12 PROCEDURE — 84443 ASSAY THYROID STIM HORMONE: CPT

## 2018-09-19 ENCOUNTER — OFFICE VISIT (OUTPATIENT)
Dept: INTERNAL MEDICINE | Facility: CLINIC | Age: 83
End: 2018-09-19
Payer: MEDICARE

## 2018-09-19 VITALS
BODY MASS INDEX: 29.56 KG/M2 | SYSTOLIC BLOOD PRESSURE: 136 MMHG | WEIGHT: 150.56 LBS | OXYGEN SATURATION: 95 % | HEART RATE: 68 BPM | HEIGHT: 60 IN | DIASTOLIC BLOOD PRESSURE: 70 MMHG | RESPIRATION RATE: 14 BRPM

## 2018-09-19 DIAGNOSIS — F51.04 CHRONIC INSOMNIA: ICD-10-CM

## 2018-09-19 DIAGNOSIS — E78.2 MIXED HYPERLIPIDEMIA: ICD-10-CM

## 2018-09-19 DIAGNOSIS — F41.1 GENERALIZED ANXIETY DISORDER: ICD-10-CM

## 2018-09-19 DIAGNOSIS — E03.4 HYPOTHYROIDISM DUE TO ACQUIRED ATROPHY OF THYROID: ICD-10-CM

## 2018-09-19 DIAGNOSIS — I10 ESSENTIAL HYPERTENSION: Primary | ICD-10-CM

## 2018-09-19 PROCEDURE — 99999 FLU VACCINE - HIGH DOSE (65+) PRESERVATIVE FREE IM: CPT | Mod: PBBFAC,,,

## 2018-09-19 PROCEDURE — 99999 PR PBB SHADOW E&M-EST. PATIENT-LVL III: CPT | Mod: PBBFAC,,, | Performed by: INTERNAL MEDICINE

## 2018-09-19 PROCEDURE — 99213 OFFICE O/P EST LOW 20 MIN: CPT | Mod: PBBFAC,25 | Performed by: INTERNAL MEDICINE

## 2018-09-19 PROCEDURE — 99214 OFFICE O/P EST MOD 30 MIN: CPT | Mod: S$PBB | Performed by: INTERNAL MEDICINE

## 2018-09-19 PROCEDURE — 90662 IIV NO PRSV INCREASED AG IM: CPT | Mod: PBBFAC

## 2018-09-19 PROCEDURE — 99999 PR STA SHADOW: CPT | Mod: PBBFAC,,, | Performed by: INTERNAL MEDICINE

## 2018-09-19 RX ORDER — ALPRAZOLAM 0.5 MG/1
0.5 TABLET ORAL 2 TIMES DAILY PRN
Qty: 60 TABLET | Refills: 0 | Status: SHIPPED | OUTPATIENT
Start: 2018-09-19 | End: 2018-10-29 | Stop reason: SDUPTHER

## 2018-09-19 RX ORDER — GABAPENTIN 300 MG/1
300 CAPSULE ORAL DAILY
Qty: 90 CAPSULE | Refills: 1 | Status: SHIPPED | OUTPATIENT
Start: 2018-09-19 | End: 2019-03-20 | Stop reason: SDUPTHER

## 2018-09-19 RX ORDER — AMLODIPINE BESYLATE 2.5 MG/1
2.5 TABLET ORAL DAILY
Qty: 90 TABLET | Refills: 1 | Status: SHIPPED | OUTPATIENT
Start: 2018-09-19 | End: 2019-03-20 | Stop reason: SDUPTHER

## 2018-09-19 RX ORDER — CILOSTAZOL 100 MG/1
100 TABLET ORAL 2 TIMES DAILY
Qty: 180 TABLET | Refills: 1 | Status: SHIPPED | OUTPATIENT
Start: 2018-09-19 | End: 2019-03-20 | Stop reason: SDUPTHER

## 2018-09-19 RX ORDER — SIMVASTATIN 40 MG/1
40 TABLET, FILM COATED ORAL NIGHTLY
Qty: 90 TABLET | Refills: 1 | Status: SHIPPED | OUTPATIENT
Start: 2018-09-19 | End: 2019-03-20 | Stop reason: SDUPTHER

## 2018-09-19 RX ORDER — CLOPIDOGREL BISULFATE 75 MG/1
75 TABLET ORAL DAILY
Qty: 90 TABLET | Refills: 1 | Status: SHIPPED | OUTPATIENT
Start: 2018-09-19 | End: 2019-03-20 | Stop reason: SDUPTHER

## 2018-09-19 RX ORDER — METOPROLOL TARTRATE 100 MG/1
100 TABLET ORAL 2 TIMES DAILY
Qty: 180 TABLET | Refills: 1 | Status: SHIPPED | OUTPATIENT
Start: 2018-09-19 | End: 2019-01-01 | Stop reason: SDUPTHER

## 2018-09-19 RX ORDER — TORSEMIDE 5 MG/1
10 TABLET ORAL DAILY
Qty: 90 TABLET | Refills: 1 | Status: SHIPPED | OUTPATIENT
Start: 2018-09-19 | End: 2019-03-20 | Stop reason: SDUPTHER

## 2018-09-19 NOTE — PROGRESS NOTES
Subjective:       Patient ID: Chanda Kaufman is a 89 y.o. female.    Chief Complaint: Hypertension (FOLLOW UP WITH LAB REVIEW); Hyperlipidemia; and Anxiety    Chanda Kaufman is a 89  y.o. female who presents for thyroid issues , Hypertension, and Hyperlipidemia follow up. Labs were reviewed with patient today.            Hyperlipidemia   This is a chronic problem. The problem is controlled. Recent lipid tests were reviewed and are low. Exacerbating diseases include hypothyroidism and obesity. Pertinent negatives include no chest pain, myalgias or shortness of breath. Current antihyperlipidemic treatment includes statins. The current treatment provides significant improvement of lipids.   Hypertension   This is a chronic problem. The problem is controlled. Associated symptoms include anxiety. Pertinent negatives include no chest pain, neck pain, palpitations or shortness of breath. Past treatments include diuretics, ACE inhibitors and beta blockers. The current treatment provides moderate improvement. Identifiable causes of hypertension include a thyroid problem.   Anxiety   Presents for follow-up visit. Symptoms include excessive worry, insomnia, muscle tension, nervous/anxious behavior and restlessness. Patient reports no chest pain, confusion, dizziness, nausea, palpitations, shortness of breath or suicidal ideas. Symptoms occur most days. The severity of symptoms is severe.       Thyroid Problem   Symptoms include anxiety. Patient reports no palpitations. Her past medical history is significant for hyperlipidemia.   Medication Refill   Associated symptoms include arthralgias and joint swelling. Pertinent negatives include no chest pain, chills, congestion, coughing, fever, myalgias, nausea, neck pain, numbness, sore throat or vomiting.     Review of Systems   Constitutional: Negative for chills and fever.   HENT: Negative for congestion, hearing loss, sinus pressure and sore throat.    Eyes: Negative for  photophobia.   Respiratory: Negative for cough, choking, chest tightness, shortness of breath and wheezing.    Cardiovascular: Negative for chest pain and palpitations.   Gastrointestinal: Negative for blood in stool, nausea and vomiting.   Genitourinary: Negative for dysuria and hematuria.   Musculoskeletal: Positive for arthralgias, gait problem and joint swelling. Negative for myalgias and neck pain.   Skin: Negative for pallor.   Neurological: Negative for dizziness and numbness.   Hematological: Does not bruise/bleed easily.   Psychiatric/Behavioral: Positive for sleep disturbance. Negative for confusion and suicidal ideas. The patient is nervous/anxious and has insomnia.        Objective:      Physical Exam   Constitutional: She is oriented to person, place, and time. She appears well-developed and well-nourished.   HENT:   Head: Normocephalic and atraumatic.   Right Ear: External ear normal.   Left Ear: External ear normal.   Mouth/Throat: Oropharynx is clear and moist.   Eyes: Conjunctivae and EOM are normal. Pupils are equal, round, and reactive to light.   Neck: Normal range of motion. Neck supple. No JVD present. No tracheal deviation present. No thyromegaly present.   Cardiovascular: Normal rate, regular rhythm, normal heart sounds and intact distal pulses.   Pulmonary/Chest: Effort normal and breath sounds normal. No respiratory distress. She has no wheezes. She has no rales. She exhibits no tenderness.   Abdominal: Soft. Bowel sounds are normal. She exhibits no distension and no mass. There is no tenderness. There is no rebound and no guarding.   Musculoskeletal: Normal range of motion. She exhibits no edema.   Lymphadenopathy:     She has no cervical adenopathy.   Neurological: She is alert and oriented to person, place, and time. She has normal reflexes. No cranial nerve deficit. She exhibits normal muscle tone. Coordination normal.   Skin: Skin is warm and dry.   Psychiatric: She has a normal mood  and affect.   Nursing note and vitals reviewed.      Assessment:       1. Essential hypertension    2. Mixed hyperlipidemia    3. Hypothyroidism due to acquired atrophy of thyroid    4. Chronic insomnia    5. Generalized anxiety disorder        Plan:   Chanda was seen today for hypertension, hyperlipidemia and anxiety.    Diagnoses and all orders for this visit:    Essential hypertension  -     CBC auto differential; Future  -     Comprehensive metabolic panel; Future    Well controlled.  Continue same medication and dose.  1. Keep weight close to ideal body weight.   2.   Avoid high salt foods (olives, pickles, smoked meats, salted potato chips, etc.).   Do not add salt to your food at the table.   Use only small amounts of salt when cooking.   3. Begin an exercise program. Discuss with your doctor what type of exercise program would be best for you. It doesn't have to be difficult. Even brisk walking for 20 minutes three times a week is a good form of exercise.   4. Avoid medicines which contain heart stimulants. This includes many cold and sinus decongestant pills and sprays as well as diet pills. Check the warnings about hypertension on the label. Stimulants such as amphetamine or cocaine could be lethal for someone with hypertension. Never take these.    Mixed hyperlipidemia  -     Lipid panel; Future  -     TSH; Future  Limit the cholesterol in your diet to less than 300 mg per day.   Fats should contribute no more than 20 to 35% of your daily calories.   Less than 7 to 10% of your calories should come from saturated fat.   Avoid saturated fat products e.g., Butter, some oils, meat, and poultry fat contain a lot of saturated fat.   Check food labels for fat and cholesterol content. Choose the foods with less fat per serving.   Limit the amount of butter and margarine you eat.   Use salad dressings and margarine made with polyunsaturated and monounsaturated fats.   Use egg whites or egg substitutes rather than  whole eggs.   Replace whole-milk dairy products with nonfat or low-fat milk, cheese, spreads, and yogurt.   Eat skinless chicken, turkey, fish, and meatless entrees more often than red meat.   Choose lean cuts of meat and trim off all visible fat. Keep portion sizes moderate.   Avoid fatty desserts such as ice cream, cream-filled cakes, and cheesecakes. Choose fresh fruits, nonfat frozen yogurt, Popsicles, etc.   Reduce the amount of fried foods, vending machine food, and fast food you eat.   Eat fruits and vegetables (especially fresh fruits and leafy vegetables), beans, and whole grains daily. The fiber in these foods helps lower cholesterol.   Look for low-fat or nonfat varieties of the foods you like to eat, or look for substitutes.   You may need to exercise 60 minutes a day to prevent weight gain and 90 minutes a day to lose weight.  Hypothyroidism due to acquired atrophy of thyroid  -     TSH; Future  Well controlled.  Continue same medication and dose.  Chronic insomnia  -     TSH; Future  Alprazolam helps    Generalized anxiety disorder  -     TSH; Future  Alprazolam helps    Other orders  -     ALPRAZolam (XANAX) 0.5 MG tablet; Take 1 tablet (0.5 mg total) by mouth 2 (two) times daily as needed.  -     amLODIPine (NORVASC) 2.5 MG tablet; Take 1 tablet (2.5 mg total) by mouth once daily.  -     cilostazol (PLETAL) 100 MG Tab; Take 1 tablet (100 mg total) by mouth 2 (two) times daily.  -     clopidogrel (PLAVIX) 75 mg tablet; Take 1 tablet (75 mg total) by mouth once daily.  -     gabapentin (NEURONTIN) 300 MG capsule; Take 1 capsule (300 mg total) by mouth once daily.  -     metoprolol tartrate (LOPRESSOR) 100 MG tablet; Take 1 tablet (100 mg total) by mouth 2 (two) times daily.  -     simvastatin (ZOCOR) 40 MG tablet; Take 1 tablet (40 mg total) by mouth nightly.  -     torsemide (DEMADEX) 5 MG Tab; Take 2 tablets (10 mg total) by mouth once daily.

## 2018-10-29 RX ORDER — ALPRAZOLAM 0.5 MG/1
0.5 TABLET ORAL 2 TIMES DAILY PRN
Qty: 60 TABLET | Refills: 0 | Status: SHIPPED | OUTPATIENT
Start: 2018-10-29 | End: 2018-10-30 | Stop reason: SDUPTHER

## 2018-10-29 NOTE — TELEPHONE ENCOUNTER
----- Message from Teresa Dai sent at 10/29/2018 10:48 AM CDT -----  Contact: TONNY / DAUGHTER  Chanda Kaufman  MRN: [id number]  : 1928  PCP: Lucina Carter  Home Phone      [phone]  Work Phone      Not on file.  Mobile          [phone]      MESSAGE: NEEDS REFILL ON XANAX    PHONE: [phone]    [facility]ARMACY: [facility]

## 2018-10-30 RX ORDER — ALPRAZOLAM 0.5 MG/1
TABLET ORAL
Qty: 60 TABLET | Refills: 0 | OUTPATIENT
Start: 2018-10-30

## 2018-10-30 RX ORDER — ALPRAZOLAM 0.5 MG/1
TABLET ORAL
Qty: 60 TABLET | Refills: 3 | Status: SHIPPED | OUTPATIENT
Start: 2018-10-30 | End: 2019-02-27 | Stop reason: SDUPTHER

## 2018-10-31 RX ORDER — ALPRAZOLAM 0.5 MG/1
TABLET ORAL
Qty: 60 TABLET | Refills: 0 | OUTPATIENT
Start: 2018-10-31

## 2018-12-05 ENCOUNTER — HOSPITAL ENCOUNTER (OUTPATIENT)
Dept: RADIOLOGY | Facility: HOSPITAL | Age: 83
Discharge: HOME | End: 2018-12-05
Attending: SURGERY
Payer: MEDICARE

## 2018-12-05 VITALS — BODY MASS INDEX: 29.45 KG/M2 | WEIGHT: 150 LBS | HEIGHT: 60 IN

## 2018-12-05 DIAGNOSIS — Z12.39 BREAST CANCER SCREENING: ICD-10-CM

## 2018-12-05 PROCEDURE — 77063 BREAST TOMOSYNTHESIS BI: CPT | Mod: 26,,, | Performed by: RADIOLOGY

## 2018-12-05 PROCEDURE — 77067 SCR MAMMO BI INCL CAD: CPT | Mod: TC

## 2018-12-05 PROCEDURE — 77067 SCR MAMMO BI INCL CAD: CPT | Mod: 26,,, | Performed by: RADIOLOGY

## 2018-12-05 PROCEDURE — 77063 BREAST TOMOSYNTHESIS BI: CPT | Mod: TC

## 2019-01-01 ENCOUNTER — OFFICE VISIT (OUTPATIENT)
Dept: INTERNAL MEDICINE | Facility: CLINIC | Age: 84
End: 2019-01-01
Payer: MEDICARE

## 2019-01-01 ENCOUNTER — LAB VISIT (OUTPATIENT)
Dept: LAB | Facility: HOSPITAL | Age: 84
End: 2019-01-01
Attending: INTERNAL MEDICINE
Payer: MEDICARE

## 2019-01-01 ENCOUNTER — HOSPITAL ENCOUNTER (EMERGENCY)
Facility: HOSPITAL | Age: 84
Discharge: ANOTHER HOSPITAL | End: 2019-07-11
Attending: SURGERY
Payer: MEDICARE

## 2019-01-01 VITALS
RESPIRATION RATE: 16 BRPM | HEART RATE: 68 BPM | DIASTOLIC BLOOD PRESSURE: 56 MMHG | OXYGEN SATURATION: 93 % | HEIGHT: 60 IN | SYSTOLIC BLOOD PRESSURE: 126 MMHG | BODY MASS INDEX: 29.61 KG/M2 | WEIGHT: 150.81 LBS

## 2019-01-01 VITALS
SYSTOLIC BLOOD PRESSURE: 141 MMHG | TEMPERATURE: 96 F | DIASTOLIC BLOOD PRESSURE: 65 MMHG | WEIGHT: 150 LBS | BODY MASS INDEX: 29.45 KG/M2 | RESPIRATION RATE: 22 BRPM | OXYGEN SATURATION: 94 % | HEIGHT: 60 IN | HEART RATE: 74 BPM

## 2019-01-01 DIAGNOSIS — K92.2 GI BLEED: Primary | ICD-10-CM

## 2019-01-01 DIAGNOSIS — M25.551 RIGHT HIP PAIN: ICD-10-CM

## 2019-01-01 DIAGNOSIS — S72.001A CLOSED DISPLACED FRACTURE OF RIGHT FEMORAL NECK: Primary | ICD-10-CM

## 2019-01-01 DIAGNOSIS — M54.31 BILATERAL SCIATICA: Primary | ICD-10-CM

## 2019-01-01 DIAGNOSIS — M54.32 BILATERAL SCIATICA: Primary | ICD-10-CM

## 2019-01-01 LAB
BASOPHILS # BLD AUTO: 0.03 K/UL (ref 0–0.2)
BASOPHILS NFR BLD: 0.3 % (ref 0–1.9)
DIFFERENTIAL METHOD: ABNORMAL
EOSINOPHIL # BLD AUTO: 0.1 K/UL (ref 0–0.5)
EOSINOPHIL NFR BLD: 1.3 % (ref 0–8)
ERYTHROCYTE [DISTWIDTH] IN BLOOD BY AUTOMATED COUNT: 16.2 % (ref 11.5–14.5)
HCT VFR BLD AUTO: 36.6 % (ref 37–48.5)
HGB BLD-MCNC: 11.7 G/DL (ref 12–16)
IMM GRANULOCYTES # BLD AUTO: 0.08 K/UL (ref 0–0.04)
IMM GRANULOCYTES NFR BLD AUTO: 0.8 % (ref 0–0.5)
LYMPHOCYTES # BLD AUTO: 1.5 K/UL (ref 1–4.8)
LYMPHOCYTES NFR BLD: 15 % (ref 18–48)
MCH RBC QN AUTO: 30.7 PG (ref 27–31)
MCHC RBC AUTO-ENTMCNC: 32 G/DL (ref 32–36)
MCV RBC AUTO: 96 FL (ref 82–98)
MONOCYTES # BLD AUTO: 1.3 K/UL (ref 0.3–1)
MONOCYTES NFR BLD: 12.8 % (ref 4–15)
NEUTROPHILS # BLD AUTO: 6.8 K/UL (ref 1.8–7.7)
NEUTROPHILS NFR BLD: 69.8 % (ref 38–73)
NRBC BLD-RTO: 0 /100 WBC
PLATELET # BLD AUTO: 212 K/UL (ref 150–350)
PMV BLD AUTO: 10 FL (ref 9.2–12.9)
RBC # BLD AUTO: 3.81 M/UL (ref 4–5.4)
WBC # BLD AUTO: 9.8 K/UL (ref 3.9–12.7)

## 2019-01-01 PROCEDURE — 96374 THER/PROPH/DIAG INJ IV PUSH: CPT

## 2019-01-01 PROCEDURE — 85025 COMPLETE CBC W/AUTO DIFF WBC: CPT

## 2019-01-01 PROCEDURE — 99214 OFFICE O/P EST MOD 30 MIN: CPT | Mod: PBBFAC | Performed by: NURSE PRACTITIONER

## 2019-01-01 PROCEDURE — 99285 EMERGENCY DEPT VISIT HI MDM: CPT | Mod: 25

## 2019-01-01 PROCEDURE — 96375 TX/PRO/DX INJ NEW DRUG ADDON: CPT

## 2019-01-01 PROCEDURE — 63600175 PHARM REV CODE 636 W HCPCS: Performed by: SURGERY

## 2019-01-01 PROCEDURE — 99999 PR PBB SHADOW E&M-EST. PATIENT-LVL IV: CPT | Mod: PBBFAC,,, | Performed by: NURSE PRACTITIONER

## 2019-01-01 PROCEDURE — 99213 OFFICE O/P EST LOW 20 MIN: CPT | Mod: S$PBB | Performed by: NURSE PRACTITIONER

## 2019-01-01 PROCEDURE — 99999 PR STA SHADOW: CPT | Mod: PBBFAC,,, | Performed by: NURSE PRACTITIONER

## 2019-01-01 PROCEDURE — 99999 PR STA SHADOW: ICD-10-PCS | Mod: PBBFAC,,, | Performed by: NURSE PRACTITIONER

## 2019-01-01 RX ORDER — ALPRAZOLAM 0.5 MG/1
TABLET ORAL
Qty: 60 TABLET | Refills: 3 | OUTPATIENT
Start: 2019-01-01

## 2019-01-01 RX ORDER — METOPROLOL TARTRATE 100 MG/1
TABLET ORAL
Qty: 180 TABLET | Refills: 1 | Status: SHIPPED | OUTPATIENT
Start: 2019-01-01

## 2019-01-01 RX ORDER — DEXTROMETHORPHAN HYDROBROMIDE, GUAIFENESIN 5; 100 MG/5ML; MG/5ML
650 LIQUID ORAL EVERY 8 HOURS
Qty: 30 TABLET | Refills: 0 | Status: SHIPPED | OUTPATIENT
Start: 2019-01-01

## 2019-01-01 RX ORDER — MORPHINE SULFATE 2 MG/ML
1 INJECTION, SOLUTION INTRAMUSCULAR; INTRAVENOUS
Status: COMPLETED | OUTPATIENT
Start: 2019-01-01 | End: 2019-01-01

## 2019-01-01 RX ORDER — LISINOPRIL 10 MG/1
10 TABLET ORAL DAILY
Qty: 90 TABLET | Refills: 1 | Status: ON HOLD | OUTPATIENT
Start: 2019-01-01 | End: 2019-01-01

## 2019-01-01 RX ORDER — ALPRAZOLAM 0.5 MG/1
0.5 TABLET ORAL 2 TIMES DAILY PRN
Qty: 60 TABLET | Refills: 0 | Status: SHIPPED | OUTPATIENT
Start: 2019-01-01

## 2019-01-01 RX ORDER — ONDANSETRON 2 MG/ML
4 INJECTION INTRAMUSCULAR; INTRAVENOUS
Status: COMPLETED | OUTPATIENT
Start: 2019-01-01 | End: 2019-01-01

## 2019-01-01 RX ORDER — FERROUS SULFATE, DRIED 160(50) MG
1 TABLET, EXTENDED RELEASE ORAL 2 TIMES DAILY WITH MEALS
COMMUNITY

## 2019-01-01 RX ADMIN — MORPHINE SULFATE 1 MG: 2 INJECTION, SOLUTION INTRAMUSCULAR; INTRAVENOUS at 06:07

## 2019-01-01 RX ADMIN — ONDANSETRON 4 MG: 2 INJECTION INTRAMUSCULAR; INTRAVENOUS at 06:07

## 2019-01-18 ENCOUNTER — HOSPITAL ENCOUNTER (EMERGENCY)
Facility: HOSPITAL | Age: 84
Discharge: ANOTHER HOSPITAL | End: 2019-01-18
Attending: SURGERY
Payer: MEDICARE

## 2019-01-18 VITALS
HEART RATE: 90 BPM | RESPIRATION RATE: 18 BRPM | TEMPERATURE: 98 F | SYSTOLIC BLOOD PRESSURE: 154 MMHG | OXYGEN SATURATION: 97 % | DIASTOLIC BLOOD PRESSURE: 74 MMHG
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
## 2019-01-18 DIAGNOSIS — S02.412A CLOSED LE FORT II FRACTURE, INITIAL ENCOUNTER: ICD-10-CM
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
## 2019-01-18 DIAGNOSIS — I60.9 SUBARACHNOID BLEED: ICD-10-CM

## 2019-01-18 DIAGNOSIS — S01.512A TONGUE LACERATION, INITIAL ENCOUNTER: ICD-10-CM

## 2019-01-18 LAB
ALBUMIN SERPL BCP-MCNC: 3.3 G/DL
ALP SERPL-CCNC: 46 U/L
ALT SERPL W/O P-5'-P-CCNC: 13 U/L
ANION GAP SERPL CALC-SCNC: 10 MMOL/L
APTT BLDCRRT: 29.9 SEC
AST SERPL-CCNC: 17 U/L
BASOPHILS # BLD AUTO: 0.02 K/UL
BASOPHILS NFR BLD: 0.2 %
BILIRUB SERPL-MCNC: 0.7 MG/DL
BNP SERPL-MCNC: 38 PG/ML
BUN SERPL-MCNC: 50 MG/DL
CALCIUM SERPL-MCNC: 9.4 MG/DL
CHLORIDE SERPL-SCNC: 99 MMOL/L
CK MB SERPL-MCNC: 2.7 NG/ML
CK MB SERPL-RTO: 3.4 %
CK SERPL-CCNC: 79 U/L
CK SERPL-CCNC: 79 U/L
CO2 SERPL-SCNC: 31 MMOL/L
CREAT SERPL-MCNC: 2.6 MG/DL
DIFFERENTIAL METHOD: ABNORMAL
EOSINOPHIL # BLD AUTO: 0.1 K/UL
EOSINOPHIL NFR BLD: 1.2 %
ERYTHROCYTE [DISTWIDTH] IN BLOOD BY AUTOMATED COUNT: 12.4 %
EST. GFR  (AFRICAN AMERICAN): 18 ML/MIN/1.73 M^2
EST. GFR  (NON AFRICAN AMERICAN): 16 ML/MIN/1.73 M^2
GLUCOSE SERPL-MCNC: 120 MG/DL
HCT VFR BLD AUTO: 38.7 %
HGB BLD-MCNC: 12.7 G/DL
INR PPP: 1
LYMPHOCYTES # BLD AUTO: 1.3 K/UL
LYMPHOCYTES NFR BLD: 13.4 %
MCH RBC QN AUTO: 32.6 PG
MCHC RBC AUTO-ENTMCNC: 32.8 G/DL
MCV RBC AUTO: 100 FL
MONOCYTES # BLD AUTO: 1.1 K/UL
MONOCYTES NFR BLD: 11.8 %
NEUTROPHILS # BLD AUTO: 6.8 K/UL
NEUTROPHILS NFR BLD: 73.4 %
PLATELET # BLD AUTO: 214 K/UL
PMV BLD AUTO: 9.2 FL
POTASSIUM SERPL-SCNC: 3.5 MMOL/L
PROT SERPL-MCNC: 7.2 G/DL
PROTHROMBIN TIME: 10.4 SEC
RBC # BLD AUTO: 3.89 M/UL
SODIUM SERPL-SCNC: 140 MMOL/L
TROPONIN I SERPL DL<=0.01 NG/ML-MCNC: 0.01 NG/ML
WBC # BLD AUTO: 9.32 K/UL

## 2019-01-18 PROCEDURE — 41252 REPAIR TONGUE LACERATION: CPT

## 2019-01-18 PROCEDURE — 99285 EMERGENCY DEPT VISIT HI MDM: CPT | Mod: 25

## 2019-01-18 PROCEDURE — 85025 COMPLETE CBC W/AUTO DIFF WBC: CPT

## 2019-01-18 PROCEDURE — 36415 COLL VENOUS BLD VENIPUNCTURE: CPT

## 2019-01-18 PROCEDURE — 83880 ASSAY OF NATRIURETIC PEPTIDE: CPT

## 2019-01-18 PROCEDURE — 25000003 PHARM REV CODE 250: Performed by: SURGERY

## 2019-01-18 PROCEDURE — 85610 PROTHROMBIN TIME: CPT

## 2019-01-18 PROCEDURE — 93010 ELECTROCARDIOGRAM REPORT: CPT | Mod: ,,, | Performed by: INTERNAL MEDICINE

## 2019-01-18 PROCEDURE — 90471 IMMUNIZATION ADMIN: CPT | Performed by: SURGERY

## 2019-01-18 PROCEDURE — 90715 TDAP VACCINE 7 YRS/> IM: CPT | Performed by: SURGERY

## 2019-01-18 PROCEDURE — 93005 ELECTROCARDIOGRAM TRACING: CPT

## 2019-01-18 PROCEDURE — 93010 EKG 12-LEAD: ICD-10-PCS | Mod: ,,, | Performed by: INTERNAL MEDICINE

## 2019-01-18 PROCEDURE — 85730 THROMBOPLASTIN TIME PARTIAL: CPT

## 2019-01-18 PROCEDURE — 63600175 PHARM REV CODE 636 W HCPCS: Performed by: SURGERY

## 2019-01-18 PROCEDURE — 96365 THER/PROPH/DIAG IV INF INIT: CPT

## 2019-01-18 PROCEDURE — 80053 COMPREHEN METABOLIC PANEL: CPT

## 2019-01-18 PROCEDURE — 82553 CREATINE MB FRACTION: CPT

## 2019-01-18 PROCEDURE — 96374 THER/PROPH/DIAG INJ IV PUSH: CPT | Mod: 59

## 2019-01-18 PROCEDURE — S0077 INJECTION, CLINDAMYCIN PHOSP: HCPCS | Performed by: SURGERY

## 2019-01-18 PROCEDURE — 82550 ASSAY OF CK (CPK): CPT

## 2019-01-18 PROCEDURE — 96361 HYDRATE IV INFUSION ADD-ON: CPT | Mod: 59

## 2019-01-18 PROCEDURE — 84484 ASSAY OF TROPONIN QUANT: CPT

## 2019-01-18 RX ORDER — LIDOCAINE HYDROCHLORIDE AND EPINEPHRINE 10; 10 MG/ML; UG/ML
INJECTION, SOLUTION INFILTRATION; PERINEURAL
Status: COMPLETED
Start: 2019-01-18 | End: 2019-01-18

## 2019-01-18 RX ORDER — CLINDAMYCIN PHOSPHATE 600 MG/50ML
600 INJECTION, SOLUTION INTRAVENOUS
Status: COMPLETED | OUTPATIENT
Start: 2019-01-18 | End: 2019-01-18

## 2019-01-18 RX ORDER — LIDOCAINE HYDROCHLORIDE AND EPINEPHRINE 10; 10 MG/ML; UG/ML
1 INJECTION, SOLUTION INFILTRATION; PERINEURAL ONCE
Status: COMPLETED | OUTPATIENT
Start: 2019-01-18 | End: 2019-01-18

## 2019-01-18 RX ADMIN — SODIUM CHLORIDE 500 ML: 0.9 INJECTION, SOLUTION INTRAVENOUS at 10:01

## 2019-01-18 RX ADMIN — CLINDAMYCIN IN 5 PERCENT DEXTROSE 600 MG: 12 INJECTION, SOLUTION INTRAVENOUS at 11:01

## 2019-01-18 RX ADMIN — CLOSTRIDIUM TETANI TOXOID ANTIGEN (FORMALDEHYDE INACTIVATED), CORYNEBACTERIUM DIPHTHERIAE TOXOID ANTIGEN (FORMALDEHYDE INACTIVATED), BORDETELLA PERTUSSIS TOXOID ANTIGEN (GLUTARALDEHYDE INACTIVATED), BORDETELLA PERTUSSIS FILAMENTOUS HEMAGGLUTININ ANTIGEN (FORMALDEHYDE INACTIVATED), BORDETELLA PERTUSSIS PERTACTIN ANTIGEN, AND BORDETELLA PERTUSSIS FIMBRIAE 2/3 ANTIGEN 0.5 ML: 5; 2; 2.5; 5; 3; 5 INJECTION, SUSPENSION INTRAMUSCULAR at 11:01

## 2019-01-18 NOTE — ED PROVIDER NOTES
[facility]                                                 Chief Complaint  89 y.o. female with Fall    History of Present Illness  Chanda Kaufman presents to the emergency room with facial trauma today  Patient was walking across the road when she had a slip and fall this a.m.  Patient fell face 1st with obvious nasal fracture and contusions noted now  She has bilateral periorbital ecchymosis and nasal bridge bruising this a.m.  Patient also has a significant through and through tongue laceration on exam  Patient is on Plavix, the tongue laceration is bleeding, needs closure ASAP  Evaluation shows significant facial fracture and a subarachnoid hemorrhage  Patient will be transferred to [facility] ASAP    The history is provided by the patient   device was not used during this ER visit  Medical history: Anxiety, breast cancer, HLD, HTN, osteoporosis, PVD  Surgeries: Cholecystectomy, hysterectomy, eye surgery, mastectomy  Allergies: Penicillin    Review of Systems and Physical Exam      Review of Systems  -- Constitution - no fever, denies fatigue, no weakness, no chills  -- Eyes - no tearing or redness, no visual disturbance  -- Ear, Nose - facial and nasal pain post fall  -- Mouth,Throat -bit her tongue during the fall with obvious tongue laceration  -- Respiratory - denies cough and congestion, no shortness of breath, no BATEMAN  -- Cardiovascular - denies chest pain, no palpitations, denies claudication  -- Gastrointestinal - denies abdominal pain, nausea, vomiting, or diarrhea  -- Genitourinary - no dysuria, no hematuria, no flank pain, no bladder pain  -- Musculoskeletal - denies back pain, negative for trauma or injury  -- Neurological - no headache, denies weakness or seizure; no LOC  -- Skin - denies pallor, rash, or changes in skin. no hives or welts noted    Vital Signs  Blood pressure is 114/58 and her pulse is 94. H  Her respiration is 20 and  oxygen saturation is 97%.     Physical Exam  -- Nursing note and vitals reviewed  -- Constitutional: Appears well-developed and well-nourished  -- Head:  Swelling and bruising of the nasal bridge with periorbital ecchymosis  -- Eyes: Pupils are equal and reactive to light. Normal conjunctiva and lids  -- Nose: Nose normal in appearance, nares grossly normal. No discharge  -- Throat: 4 centimeter through and through tongue laceration at the distal aspect  -- Ears: External ears and TM normal bilaterally. Normal hearing and no drainage  -- Neck: Normal range of motion. Neck supple. No masses, trachea midline  -- Cardiac: Normal rate, regular rhythm and normal heart sounds  -- Pulmonary: Normal respiratory effort, breath sounds clear to auscultation  -- Abdominal: Soft, no tenderness. Normal bowel sounds. Normal liver edge  -- Musculoskeletal: Normal range of motion, no effusions. Joints stable   -- Neurological: No focal deficits. Showed good interaction with staff  -- Skin: Warm and dry. No evidence of rash or cellulitis    Emergency Room Course      Laceration Repair  -- Performed by: JEAN PIERRE BERMUDEZ  -- Consent Done: Emergent Situation  -- Body area: Tongue  -- Laceration length: 4 centimeter  -- Tendon involvement: none  -- Nerve involvement: none  -- Vascular damage: no  -- Anesthesia: local infiltration  -- Local anesthetic: lidocaine 1% with epi  -- Anesthetic total: 10 ml  -- Irrigation solution: saline  -- Amount of cleaning: standard  -- Skin closure: 3-0 vicryl  -- Number of sutures: 6    Lab Results     K 4.2      CO2 27   BUN 32 (H)   CREATININE 1.4   GLU 90   ALKPHOS 41 (L)   AST 25   ALT 14   BILITOT 0.6   ALBUMIN 3.3 (L)   PROT 7.1   WBC 9.32   HGB 12.7   HCT 38.7        EKG  -- The EKG findings today were without concerning findings from baseline    CT face  Comminuted bilateral nasal bone fractures with fractures of the bony nasal septum with bilateral maxillary fractures,  comminuted on the right with involvement of the bilateral pterygoid plates.  Right-sided maxillary fractures are severely comminuted.  Additionally, there is a fracture of the right inferior orbital wall in room.      CT head   Suspected small amount of subarachnoid hemorrhage in the left frontal region and possibly in the right frontal region versus artifact although the former is felt more likely given the patient's mechanism of trauma.  No mass effect or shift.     Other radiology  -- The CT C-spine performed in the ER today was negative for acute fracture or dislocation  -- Chest x-ray showed no infiltrate and showed no acute pathology  -- Pelvis x-ray showed no evidence of fracture or dislocation    Medications Given  lidocaine-EPINEPHrine 1%-1:100,000 injection 1 mL ( Other Override Pull 1/18/19 0993)   sodium chloride 0.9% bolus 500 mL (500 mLs Intravenous New Bag 1/18/19 1030)     Critical Care ED Physician Time (minutes):  -- Performed by: Scott Cifuentes M.D.  -- Date/Time: 11:00 AM 1/18/2019   -- Direct Patient Care (Face Time): 10  -- Additional History from Records or Taking Additional History: 10  -- Ordering, Reviewing, and Interpreting Diagnostic Studies: 10  -- Total Time in Documentation: 10  -- Consultation with Other Physicians: 10  -- Consultation with Family Related to Condition: 10  -- Total Critical Care Time: 60     Diagnosis  -- Fall, initial encounter  -- Tongue laceration  -- Closed [place] II fracture, initial encounter  -- Subarachnoid bleed  -- Fall     Disposition and Plan  -- Disposition: transfer  -- Condition: stable    This note is dictated on M*Modal word recognition program.  There are word recognition mistakes that are occasionally missed on review.          Scott Cifuentes MD  01/18/19 1082

## 2019-01-18 NOTE — ED NOTES
Dr. lim with pt for sutures to pt tongue. Aseptic technique. No distress noted to pt.  Ecchymosis noted to nose and chin.  No deformity noted. No neuro deficits.

## 2019-02-06 ENCOUNTER — OFFICE VISIT (OUTPATIENT)
Dept: INTERNAL MEDICINE | Facility: CLINIC | Age: 84
End: 2019-02-06
Payer: MEDICARE

## 2019-02-06 VITALS
HEIGHT: 60 IN | BODY MASS INDEX: 28.26 KG/M2 | HEART RATE: 74 BPM | SYSTOLIC BLOOD PRESSURE: 120 MMHG | WEIGHT: 143.94 LBS | OXYGEN SATURATION: 95 % | RESPIRATION RATE: 14 BRPM | DIASTOLIC BLOOD PRESSURE: 58 MMHG

## 2019-02-06 DIAGNOSIS — S01.512D LACERATION OF TONGUE, SUBSEQUENT ENCOUNTER: ICD-10-CM

## 2019-02-06 DIAGNOSIS — I60.9 SUBARACHNOID BLEED: Primary | ICD-10-CM

## 2019-02-06 PROCEDURE — 99999 PR PBB SHADOW E&M-EST. PATIENT-LVL III: CPT | Mod: PBBFAC,,, | Performed by: INTERNAL MEDICINE

## 2019-02-06 PROCEDURE — 99213 OFFICE O/P EST LOW 20 MIN: CPT | Mod: S$PBB | Performed by: INTERNAL MEDICINE

## 2019-02-06 PROCEDURE — 99999 PR STA SHADOW: CPT | Mod: PBBFAC,,, | Performed by: INTERNAL MEDICINE

## 2019-02-06 PROCEDURE — 99213 OFFICE O/P EST LOW 20 MIN: CPT | Mod: PBBFAC | Performed by: INTERNAL MEDICINE

## 2019-02-06 PROCEDURE — 99999 PR PBB SHADOW E&M-EST. PATIENT-LVL III: ICD-10-PCS | Mod: PBBFAC,,, | Performed by: INTERNAL MEDICINE

## 2019-02-06 NOTE — PROGRESS NOTES
Subjective:       Patient ID: Chanda Kaufman is a 89 y.o. female.    Chief Complaint: Fall (HOSPITAL FOLLOW UP)    Chanda Kaufman is a 89 y.o. female  Fell ; seen in ER ; tongue laceration ; intracranial bleed .  She stayed at [facility] ; she was monitored .      She is doing well.           Review of Systems   Constitutional: Negative for chills and fever.   HENT: Negative for congestion, hearing loss, sinus pressure and sore throat.    Eyes: Negative for photophobia.   Respiratory: Negative for cough, choking, chest tightness, shortness of breath and wheezing.    Cardiovascular: Negative for chest pain and palpitations.   Gastrointestinal: Negative for blood in stool, nausea and vomiting.   Genitourinary: Negative for dysuria and hematuria.   Musculoskeletal: Positive for arthralgias, gait problem and joint swelling. Negative for myalgias and neck pain.   Skin: Positive for color change. Negative for pallor.        Facial bruising    Neurological: Negative for dizziness and numbness.   Hematological: Does not bruise/bleed easily.   Psychiatric/Behavioral: Positive for sleep disturbance. Negative for confusion and suicidal ideas. The patient is nervous/anxious.        Objective:      Physical Exam   Constitutional: She is oriented to person, place, and time. She appears well-developed and well-nourished.   HENT:   Head: Normocephalic and atraumatic.   Right Ear: External ear normal.   Left Ear: External ear normal.   Mouth/Throat: Oropharynx is clear and moist.   Eyes: Conjunctivae and EOM are normal. Pupils are equal, round, and reactive to light.   Neck: Normal range of motion. Neck supple. No JVD present. No tracheal deviation present. No thyromegaly present.   Cardiovascular: Normal rate, regular rhythm, normal heart sounds and intact distal pulses.   Pulmonary/Chest: Effort normal and breath sounds normal. No respiratory distress. She has no wheezes. She has no rales. She exhibits no tenderness.   Abdominal:  Soft. Bowel sounds are normal. She exhibits no distension and no mass. There is no tenderness. There is no rebound and no guarding.   Musculoskeletal: Normal range of motion. She exhibits no edema.   Lymphadenopathy:     She has no cervical adenopathy.   Neurological: She is alert and oriented to person, place, and time. She has normal reflexes. No cranial nerve deficit. She exhibits normal muscle tone. Coordination normal.   Skin: Skin is warm and dry.   Psychiatric: She has a normal mood and affect.   Nursing note and vitals reviewed.      Assessment:       1. Subarachnoid bleed    2. Laceration of tongue, subsequent encounter        Plan:   Chanda was seen today for fall.    Diagnoses and all orders for this visit:    Subarachnoid bleed  Fall precautions discussed.    Laceration of tongue, subsequent encounter  hoping sutures will dissolve.      Problem List Items Addressed This Visit     None

## 2019-02-13 ENCOUNTER — TELEPHONE (OUTPATIENT)
Dept: INTERNAL MEDICINE | Facility: CLINIC | Age: 84
End: 2019-02-13

## 2019-02-13 NOTE — TELEPHONE ENCOUNTER
----- Message from Barby Lilly sent at 2019  3:19 PM CST -----  Contact: Enedina/Grand Daughter  Chanda Kaufman  MRN: [id number]  : 1928  PCP: Lucina Carter  Home Phone      [phone]  Work Phone      Not on file.  Mobile          [phone]    MESSAGE:      Patient's stitches on her tongue still have not fallen out.  She was seen last week by Dr. Carter and he told her to give it another week and let him know if they do not fall out on their own. Please call to advise.    Phone: [phone]

## 2019-02-14 ENCOUNTER — OFFICE VISIT (OUTPATIENT)
Dept: INTERNAL MEDICINE | Facility: CLINIC | Age: 84
End: 2019-02-14
Payer: MEDICARE

## 2019-02-14 VITALS
WEIGHT: 142.44 LBS | HEIGHT: 60 IN | BODY MASS INDEX: 27.96 KG/M2 | HEART RATE: 65 BPM | OXYGEN SATURATION: 96 % | DIASTOLIC BLOOD PRESSURE: 60 MMHG | SYSTOLIC BLOOD PRESSURE: 120 MMHG | RESPIRATION RATE: 12 BRPM

## 2019-02-14 DIAGNOSIS — Z48.02 VISIT FOR SUTURE REMOVAL: Primary | ICD-10-CM

## 2019-02-14 DIAGNOSIS — S01.512D LACERATION OF TONGUE, SUBSEQUENT ENCOUNTER: ICD-10-CM

## 2019-02-14 PROCEDURE — 99999 PR STA SHADOW: CPT | Mod: PBBFAC,,, | Performed by: INTERNAL MEDICINE

## 2019-02-14 PROCEDURE — 99213 OFFICE O/P EST LOW 20 MIN: CPT | Mod: S$PBB | Performed by: INTERNAL MEDICINE

## 2019-02-14 PROCEDURE — 99999 PR PBB SHADOW E&M-EST. PATIENT-LVL IV: CPT | Mod: PBBFAC,,, | Performed by: INTERNAL MEDICINE

## 2019-02-14 PROCEDURE — 99999 PR PBB SHADOW E&M-EST. PATIENT-LVL IV: ICD-10-PCS | Mod: PBBFAC,,, | Performed by: INTERNAL MEDICINE

## 2019-02-14 PROCEDURE — 99214 OFFICE O/P EST MOD 30 MIN: CPT | Mod: PBBFAC | Performed by: INTERNAL MEDICINE

## 2019-02-14 NOTE — PROGRESS NOTES
Patient Instructions by LESLY Zabala at 10/12/18 10:54 AM     Author:  LESLY Zabala Service:  (none) Author Type:  Psychologist     Filed:  10/12/18 10:54 AM Encounter Date:  10/12/2018 Status:  Signed     :  LESLY Zabala (Psychologist)            PATIENT INFORMATION     Follow-Up  -- Make an appointment with LESLY Naranjo in 2-3 weeks    Additional Educational Resources: For additional resources regarding your symptoms, diagnosis, or further health information, please visit the Health Resources section on [email or web address] or the Online Health Resources section in [employer]. Revision History        User Key Date/Time User Provider Type Action    > [N/A] 10/12/18 10:54 AM Christine Shea PSY. D Psychologist Sign Subjective:       Patient ID: Chanda Kaufman is a 89 y.o. female.    Chief Complaint: Suture / Staple Removal (tongue)    Chanda Kaufman is a 89 y.o. Female.  Sutures to dissolve in 1-2 weeks; not dissolving.    Difficulty eating protein and certain foods.        Review of Systems   Constitutional: Negative for chills and fever.   HENT: Negative for congestion, hearing loss, sinus pressure and sore throat.    Eyes: Negative for photophobia.   Respiratory: Negative for cough, choking, chest tightness, shortness of breath and wheezing.    Cardiovascular: Negative for chest pain and palpitations.   Gastrointestinal: Negative for blood in stool, nausea and vomiting.   Genitourinary: Negative for dysuria and hematuria.   Musculoskeletal: Negative for arthralgias, gait problem, joint swelling, myalgias and neck pain.   Skin: Positive for color change. Negative for pallor.   Neurological: Positive for numbness. Negative for dizziness.        L Sciatic nerve   Hematological: Does not bruise/bleed easily.   Psychiatric/Behavioral: Negative for confusion, sleep disturbance and suicidal ideas. The patient is not nervous/anxious.        Objective:      Physical Exam   Constitutional: She is oriented to person, place, and time. She appears well-developed and well-nourished.   HENT:   Head: Normocephalic and atraumatic.   Right Ear: External ear normal.   Left Ear: External ear normal.   Mouth/Throat: Oropharynx is clear and moist.       Stitches intact; 5 ; 3 on top and two on bottom    Eyes: Conjunctivae and EOM are normal. Pupils are equal, round, and reactive to light.   Neck: Normal range of motion. Neck supple. No JVD present. No tracheal deviation present. No thyromegaly present.   Cardiovascular: Normal rate, regular rhythm, normal heart sounds and intact distal pulses.   Pulmonary/Chest: Effort normal and breath sounds normal. No respiratory distress. She has no wheezes. She has no rales. She exhibits no tenderness.    Abdominal: Soft. Bowel sounds are normal. She exhibits no distension and no mass. There is no tenderness. There is no rebound and no guarding.   Musculoskeletal: Normal range of motion. She exhibits no edema.   Walks with cane   Lymphadenopathy:     She has no cervical adenopathy.   Neurological: She is alert and oriented to person, place, and time. She has normal reflexes. No cranial nerve deficit. She exhibits normal muscle tone. Coordination normal.   Skin: Skin is warm and dry. There is erythema.   Facial erythema- healing well.    Psychiatric: She has a normal mood and affect.   Nursing note and vitals reviewed.      Assessment:       1. Visit for suture removal    2. Laceration of tongue, subsequent encounter        Plan:   Chanda was seen today for suture / staple removal.    Diagnoses and all orders for this visit:    Visit for suture removal    Laceration of tongue, subsequent encounter    Patient presents for suture removal. The wound is well healed without signs of infection.  The sutures are removed. Wound care and activity instructions given. Return prn.    Problem List Items Addressed This Visit     None

## 2019-02-27 RX ORDER — ALPRAZOLAM 0.5 MG/1
TABLET ORAL
Qty: 60 TABLET | Refills: 3 | Status: SHIPPED | OUTPATIENT
Start: 2019-02-27 | End: 2019-03-20 | Stop reason: SDUPTHER

## 2019-03-13 ENCOUNTER — CLINICAL SUPPORT (OUTPATIENT)
Dept: INTERNAL MEDICINE | Facility: CLINIC | Age: 84
End: 2019-03-13
Payer: MEDICARE

## 2019-03-13 DIAGNOSIS — E03.4 HYPOTHYROIDISM DUE TO ACQUIRED ATROPHY OF THYROID: ICD-10-CM

## 2019-03-13 DIAGNOSIS — E78.2 MIXED HYPERLIPIDEMIA: ICD-10-CM

## 2019-03-13 DIAGNOSIS — F41.1 GENERALIZED ANXIETY DISORDER: ICD-10-CM

## 2019-03-13 DIAGNOSIS — I10 ESSENTIAL HYPERTENSION: ICD-10-CM

## 2019-03-13 DIAGNOSIS — F51.04 CHRONIC INSOMNIA: ICD-10-CM

## 2019-03-13 LAB
ALBUMIN SERPL BCP-MCNC: 3.3 G/DL
ALP SERPL-CCNC: 55 U/L
ALT SERPL W/O P-5'-P-CCNC: 9 U/L
ANION GAP SERPL CALC-SCNC: 8 MMOL/L
AST SERPL-CCNC: 22 U/L
BASOPHILS # BLD AUTO: 0.02 K/UL
BASOPHILS NFR BLD: 0.3 %
BILIRUB SERPL-MCNC: 0.7 MG/DL
BUN SERPL-MCNC: 20 MG/DL
CALCIUM SERPL-MCNC: 10.1 MG/DL
CHLORIDE SERPL-SCNC: 103 MMOL/L
CHOLEST SERPL-MCNC: 142 MG/DL
CHOLEST/HDLC SERPL: 3 {RATIO}
CO2 SERPL-SCNC: 31 MMOL/L
CREAT SERPL-MCNC: 1.1 MG/DL
DIFFERENTIAL METHOD: ABNORMAL
EOSINOPHIL # BLD AUTO: 0.2 K/UL
EOSINOPHIL NFR BLD: 3.1 %
ERYTHROCYTE [DISTWIDTH] IN BLOOD BY AUTOMATED COUNT: 13.2 %
EST. GFR  (AFRICAN AMERICAN): 51 ML/MIN/1.73 M^2
EST. GFR  (NON AFRICAN AMERICAN): 44 ML/MIN/1.73 M^2
GLUCOSE SERPL-MCNC: 87 MG/DL
HCT VFR BLD AUTO: 38.6 %
HDLC SERPL-MCNC: 47 MG/DL
HDLC SERPL: 33.1 %
HGB BLD-MCNC: 12.2 G/DL
LDLC SERPL CALC-MCNC: 73.6 MG/DL
LYMPHOCYTES # BLD AUTO: 1.8 K/UL
LYMPHOCYTES NFR BLD: 30.2 %
MCH RBC QN AUTO: 32.3 PG
MCHC RBC AUTO-ENTMCNC: 31.6 G/DL
MCV RBC AUTO: 102 FL
MONOCYTES # BLD AUTO: 0.8 K/UL
MONOCYTES NFR BLD: 13.7 %
NEUTROPHILS # BLD AUTO: 3.1 K/UL
NEUTROPHILS NFR BLD: 52.7 %
NONHDLC SERPL-MCNC: 95 MG/DL
PLATELET # BLD AUTO: 294 K/UL
PMV BLD AUTO: 9 FL
POTASSIUM SERPL-SCNC: 3.7 MMOL/L
PROT SERPL-MCNC: 7.3 G/DL
RBC # BLD AUTO: 3.78 M/UL
SODIUM SERPL-SCNC: 142 MMOL/L
T4 FREE SERPL-MCNC: 1.06 NG/DL
TRIGL SERPL-MCNC: 107 MG/DL
TSH SERPL DL<=0.005 MIU/L-ACNC: 4.14 UIU/ML
WBC # BLD AUTO: 5.9 K/UL

## 2019-03-13 PROCEDURE — 99999 PR STA SHADOW: CPT | Mod: PBBFAC,,,

## 2019-03-13 PROCEDURE — 80053 COMPREHEN METABOLIC PANEL: CPT

## 2019-03-13 PROCEDURE — 84443 ASSAY THYROID STIM HORMONE: CPT

## 2019-03-13 PROCEDURE — 80061 LIPID PANEL: CPT

## 2019-03-13 PROCEDURE — 99211 OFF/OP EST MAY X REQ PHY/QHP: CPT | Mod: PBBFAC

## 2019-03-13 PROCEDURE — 99999 PR PBB SHADOW E&M-EST. PATIENT-LVL I: CPT | Mod: PBBFAC,,,

## 2019-03-13 PROCEDURE — 84439 ASSAY OF FREE THYROXINE: CPT

## 2019-03-13 PROCEDURE — 85025 COMPLETE CBC W/AUTO DIFF WBC: CPT

## 2019-03-13 PROCEDURE — 99999 PR PBB SHADOW E&M-EST. PATIENT-LVL I: ICD-10-PCS | Mod: PBBFAC,,,

## 2019-03-13 PROCEDURE — 36415 COLL VENOUS BLD VENIPUNCTURE: CPT | Mod: PBBFAC

## 2019-03-20 ENCOUNTER — OFFICE VISIT (OUTPATIENT)
Dept: INTERNAL MEDICINE | Facility: CLINIC | Age: 84
End: 2019-03-20
Payer: MEDICARE

## 2019-03-20 VITALS
WEIGHT: 146.63 LBS | DIASTOLIC BLOOD PRESSURE: 62 MMHG | RESPIRATION RATE: 12 BRPM | HEART RATE: 66 BPM | BODY MASS INDEX: 28.79 KG/M2 | SYSTOLIC BLOOD PRESSURE: 122 MMHG | HEIGHT: 60 IN | OXYGEN SATURATION: 98 %

## 2019-03-20 DIAGNOSIS — E03.4 HYPOTHYROIDISM DUE TO ACQUIRED ATROPHY OF THYROID: ICD-10-CM

## 2019-03-20 DIAGNOSIS — F51.04 CHRONIC INSOMNIA: ICD-10-CM

## 2019-03-20 DIAGNOSIS — I10 ESSENTIAL HYPERTENSION: Primary | ICD-10-CM

## 2019-03-20 DIAGNOSIS — E78.2 MIXED HYPERLIPIDEMIA: ICD-10-CM

## 2019-03-20 DIAGNOSIS — F41.1 GENERALIZED ANXIETY DISORDER: ICD-10-CM

## 2019-03-20 PROCEDURE — 99214 OFFICE O/P EST MOD 30 MIN: CPT | Mod: S$PBB | Performed by: INTERNAL MEDICINE

## 2019-03-20 PROCEDURE — 99999 PR STA SHADOW: CPT | Mod: PBBFAC,,, | Performed by: INTERNAL MEDICINE

## 2019-03-20 PROCEDURE — 99213 OFFICE O/P EST LOW 20 MIN: CPT | Mod: PBBFAC | Performed by: INTERNAL MEDICINE

## 2019-03-20 PROCEDURE — 99999 PR PBB SHADOW E&M-EST. PATIENT-LVL III: CPT | Mod: PBBFAC,,, | Performed by: INTERNAL MEDICINE

## 2019-03-20 PROCEDURE — 99999 PR STA SHADOW: ICD-10-PCS | Mod: PBBFAC,,, | Performed by: INTERNAL MEDICINE

## 2019-03-20 RX ORDER — TORSEMIDE 5 MG/1
10 TABLET ORAL DAILY
Qty: 90 TABLET | Refills: 1 | Status: SHIPPED | OUTPATIENT
Start: 2019-03-20

## 2019-03-20 RX ORDER — ALPRAZOLAM 0.5 MG/1
0.5 TABLET ORAL 2 TIMES DAILY PRN
Qty: 60 TABLET | Refills: 3 | Status: SHIPPED | OUTPATIENT
Start: 2019-03-20 | End: 2019-01-01 | Stop reason: SDUPTHER

## 2019-03-20 RX ORDER — GABAPENTIN 300 MG/1
300 CAPSULE ORAL DAILY
Qty: 90 CAPSULE | Refills: 1 | Status: SHIPPED | OUTPATIENT
Start: 2019-03-20

## 2019-03-20 RX ORDER — LEVOTHYROXINE SODIUM 50 UG/1
50 TABLET ORAL DAILY
Qty: 90 TABLET | Refills: 1 | Status: SHIPPED | OUTPATIENT
Start: 2019-03-20

## 2019-03-20 RX ORDER — CLOPIDOGREL BISULFATE 75 MG/1
75 TABLET ORAL DAILY
Qty: 90 TABLET | Refills: 1 | Status: ON HOLD | OUTPATIENT
Start: 2019-03-20 | End: 2019-01-01 | Stop reason: HOSPADM

## 2019-03-20 RX ORDER — CILOSTAZOL 100 MG/1
100 TABLET ORAL 2 TIMES DAILY
Qty: 180 TABLET | Refills: 1 | Status: ON HOLD | OUTPATIENT
Start: 2019-03-20 | End: 2019-01-01 | Stop reason: HOSPADM

## 2019-03-20 RX ORDER — LISINOPRIL 10 MG/1
10 TABLET ORAL DAILY
Qty: 30 TABLET | Refills: 5 | Status: SHIPPED | OUTPATIENT
Start: 2019-03-20 | End: 2019-01-01 | Stop reason: SDUPTHER

## 2019-03-20 RX ORDER — SIMVASTATIN 40 MG/1
40 TABLET, FILM COATED ORAL NIGHTLY
Qty: 90 TABLET | Refills: 1 | Status: SHIPPED | OUTPATIENT
Start: 2019-03-20

## 2019-03-20 RX ORDER — AMLODIPINE BESYLATE 2.5 MG/1
2.5 TABLET ORAL DAILY
Qty: 90 TABLET | Refills: 1 | Status: ON HOLD | OUTPATIENT
Start: 2019-03-20 | End: 2019-01-01 | Stop reason: HOSPADM

## 2019-03-20 NOTE — PROGRESS NOTES
Subjective:       Patient ID: Chanda Kaufman is a 89 y.o. female.    Chief Complaint: Hyperlipidemia (follow up with lab review); Hypertension; Anxiety; and Thyroid Problem    Chanda Kaufman is a 89  y.o. female who presents for thyroid issues , Hypertension, and Hyperlipidemia follow up. Labs were reviewed with patient today.            Hyperlipidemia   This is a chronic problem. The problem is controlled. Recent lipid tests were reviewed and are low. Exacerbating diseases include hypothyroidism and obesity. Pertinent negatives include no chest pain, myalgias or shortness of breath. Current antihyperlipidemic treatment includes statins. The current treatment provides significant improvement of lipids.   Hypertension   This is a chronic problem. The problem is controlled. Associated symptoms include anxiety. Pertinent negatives include no chest pain, neck pain, palpitations or shortness of breath. Past treatments include diuretics, ACE inhibitors and beta blockers. The current treatment provides moderate improvement. Identifiable causes of hypertension include a thyroid problem.   Anxiety   Presents for follow-up visit. Symptoms include excessive worry, insomnia, muscle tension, nervous/anxious behavior and restlessness. Patient reports no chest pain, confusion, dizziness, nausea, palpitations, shortness of breath or suicidal ideas. Symptoms occur most days. The severity of symptoms is severe.       Thyroid Problem   Symptoms include anxiety. Patient reports no palpitations. Her past medical history is significant for hyperlipidemia.   Medication Refill   Associated symptoms include arthralgias and joint swelling. Pertinent negatives include no chest pain, chills, congestion, coughing, fever, myalgias, nausea, neck pain, numbness, sore throat or vomiting.     Review of Systems   Constitutional: Negative for chills and fever.   HENT: Negative for congestion, hearing loss, sinus pressure and sore throat.    Eyes:  Negative for photophobia.   Respiratory: Negative for cough, choking, chest tightness, shortness of breath and wheezing.    Cardiovascular: Negative for chest pain and palpitations.   Gastrointestinal: Negative for blood in stool, nausea and vomiting.   Genitourinary: Negative for dysuria and hematuria.   Musculoskeletal: Positive for arthralgias, gait problem and joint swelling. Negative for myalgias and neck pain.   Skin: Negative for pallor.   Neurological: Negative for dizziness and numbness.   Hematological: Does not bruise/bleed easily.   Psychiatric/Behavioral: Positive for sleep disturbance. Negative for confusion and suicidal ideas. The patient is nervous/anxious and has insomnia.        Objective:      Physical Exam   Constitutional: She is oriented to person, place, and time. She appears well-developed and well-nourished.   HENT:   Head: Normocephalic and atraumatic.   Right Ear: External ear normal.   Left Ear: External ear normal.   Mouth/Throat: Oropharynx is clear and moist.   Eyes: Conjunctivae and EOM are normal. Pupils are equal, round, and reactive to light.   Neck: Normal range of motion. Neck supple. No JVD present. No tracheal deviation present. No thyromegaly present.   Cardiovascular: Normal rate, regular rhythm, normal heart sounds and intact distal pulses.   Pulmonary/Chest: Effort normal and breath sounds normal. No respiratory distress. She has no wheezes. She has no rales. She exhibits no tenderness.   Abdominal: Soft. Bowel sounds are normal. She exhibits no distension and no mass. There is no tenderness. There is no rebound and no guarding.   Musculoskeletal: Normal range of motion. She exhibits no edema.   Lymphadenopathy:     She has no cervical adenopathy.   Neurological: She is alert and oriented to person, place, and time. She has normal reflexes. No cranial nerve deficit. She exhibits normal muscle tone. Coordination normal.   Skin: Skin is warm and dry.   Psychiatric: She has a  normal mood and affect.   Nursing note and vitals reviewed.      Assessment:       1. Essential hypertension    2. Mixed hyperlipidemia    3. Hypothyroidism due to acquired atrophy of thyroid    4. Generalized anxiety disorder    5. Chronic insomnia        Plan:   Chanda was seen today for hyperlipidemia, hypertension, anxiety and thyroid problem.    Diagnoses and all orders for this visit:    Essential hypertension  -     CBC auto differential; Future  -     Comprehensive metabolic panel; Future    Well controlled.  Continue same medication and dose.  1. Keep weight close to ideal body weight.   2.   Avoid high salt foods (olives, pickles, smoked meats, salted potato chips, etc.).   Do not add salt to your food at the table.   Use only small amounts of salt when cooking.   3. Begin an exercise program. Discuss with your doctor what type of exercise program would be best for you. It doesn't have to be difficult. Even brisk walking for 20 minutes three times a week is a good form of exercise.   4. Avoid medicines which contain heart stimulants. This includes many cold and sinus decongestant pills and sprays as well as diet pills. Check the warnings about hypertension on the label. Stimulants such as amphetamine or cocaine could be lethal for someone with hypertension. Never take these.    Mixed hyperlipidemia  -     Lipid panel; Future  -     TSH; Future  Limit the cholesterol in your diet to less than 300 mg per day.   Fats should contribute no more than 20 to 35% of your daily calories.   Less than 7 to 10% of your calories should come from saturated fat.   Avoid saturated fat products e.g., Butter, some oils, meat, and poultry fat contain a lot of saturated fat.   Check food labels for fat and cholesterol content. Choose the foods with less fat per serving.   Limit the amount of butter and margarine you eat.   Use salad dressings and margarine made with polyunsaturated and monounsaturated fats.   Use egg whites or  egg substitutes rather than whole eggs.   Replace whole-milk dairy products with nonfat or low-fat milk, cheese, spreads, and yogurt.   Eat skinless chicken, turkey, fish, and meatless entrees more often than red meat.   Choose lean cuts of meat and trim off all visible fat. Keep portion sizes moderate.   Avoid fatty desserts such as ice cream, cream-filled cakes, and cheesecakes. Choose fresh fruits, nonfat frozen yogurt, Popsicles, etc.   Reduce the amount of fried foods, vending machine food, and fast food you eat.   Eat fruits and vegetables (especially fresh fruits and leafy vegetables), beans, and whole grains daily. The fiber in these foods helps lower cholesterol.   Look for low-fat or nonfat varieties of the foods you like to eat, or look for substitutes.   You may need to exercise 60 minutes a day to prevent weight gain and 90 minutes a day to lose weight.Hypothyroidism due to acquired atrophy of thyroid  -     TSH; Future  -     levothyroxine (SYNTHROID) 50 MCG tablet; Take 1 tablet (50 mcg total) by mouth once daily.    Generalized anxiety disorder  -     TSH; Future  Alprazolam helps    Chronic insomnia  -     TSH; Future  Alprazolam     Other orders  -     ALPRAZolam (XANAX) 0.5 MG tablet; Take 1 tablet (0.5 mg total) by mouth 2 (two) times daily as needed.  -     amLODIPine (NORVASC) 2.5 MG tablet; Take 1 tablet (2.5 mg total) by mouth once daily.  -     cilostazol (PLETAL) 100 MG Tab; Take 1 tablet (100 mg total) by mouth 2 (two) times daily.  -     clopidogrel (PLAVIX) 75 mg tablet; Take 1 tablet (75 mg total) by mouth once daily.  -     gabapentin (NEURONTIN) 300 MG capsule; Take 1 capsule (300 mg total) by mouth once daily.  -     lisinopril 10 MG tablet; Take 1 tablet (10 mg total) by mouth once daily.  -     simvastatin (ZOCOR) 40 MG tablet; Take 1 tablet (40 mg total) by mouth nightly.  -     torsemide (DEMADEX) 5 MG Tab; Take 2 tablets (10 mg total) by mouth once daily.      Problem List Items  Addressed This Visit     Hyperlipidemia    Relevant Orders    Lipid panel    TSH    Anxiety disorder    Relevant Orders    TSH    Chronic insomnia    Relevant Orders    TSH    Essential hypertension - Primary    Relevant Orders    CBC auto differential    Comprehensive metabolic panel    Hypothyroidism due to acquired atrophy of thyroid    Relevant Medications    levothyroxine (SYNTHROID) 50 MCG tablet    Other Relevant Orders    TSH

## 2019-06-28 PROBLEM — Z85.3 HISTORY OF BREAST CANCER: Status: ACTIVE | Noted: 2019-01-01

## 2019-06-28 NOTE — PROGRESS NOTES
Subjective:       Patient ID: Chanda Kaufman is a 89 y.o. female.    Chief Complaint: Leg Pain (both legs)    Pt known to me. Presents with bilateral hip and upper thigh pain, R>L with occasional low back pain. Pt states she is inactive, sits in her chair most of the day and does not move around much. She denies trauma, injury or fall. Pain worse with direct pressure, when laying flat in bed and with twisting or turning. She ambulates with cane or walker support. She has been using topical cream with mild relief. Pt has hx of bilateral leg blockages with stents in place. She denies acute swelling, redness, paresis or paresthesia.     Arthritis   Presents for follow-up visit. She complains of pain and stiffness. She reports no joint swelling or joint warmth. The symptoms have been stable. Affected locations include the right hip and left hip. Pain scale currently: moderate. Associated symptoms include pain at night and pain while resting. Pertinent negatives include no diarrhea, dry eyes, dry mouth, dysuria, fatigue, fever, rash, Raynaud's syndrome, uveitis or weight loss.     Review of Systems   Constitutional: Negative for activity change, appetite change, fatigue, fever, unexpected weight change and weight loss.   Respiratory: Negative for chest tightness and shortness of breath.    Cardiovascular: Negative for chest pain and palpitations.   Gastrointestinal: Negative for abdominal pain, constipation, diarrhea, nausea and vomiting.   Genitourinary: Negative for difficulty urinating and dysuria.   Musculoskeletal: Positive for arthralgias, arthritis, back pain, gait problem, myalgias and stiffness. Negative for joint swelling, neck pain and neck stiffness.   Skin: Negative for rash.   Neurological: Negative for headaches.   Hematological: Bruises/bleeds easily (on plavix).   All other systems reviewed and are negative.      Objective:      Physical Exam   Constitutional: She is oriented to person, place, and time.  Vital signs are normal. She appears well-developed and well-nourished. No distress.   HENT:   Head: Normocephalic and atraumatic.   Right Ear: External ear normal.   Left Ear: External ear normal.   Nose: Nose normal.   Eyes: Conjunctivae and lids are normal. Right eye exhibits no discharge. Left eye exhibits no discharge. No scleral icterus.   Neck: Phonation normal. Neck supple.   Cardiovascular:   Pulses:       Posterior tibial pulses are 1+ on the right side, and 1+ on the left side.   Bilateral LE pale though perfused with mild nonpitting edema    Pulmonary/Chest: Effort normal. No accessory muscle usage. No respiratory distress.   Abdominal: Normal appearance.   Musculoskeletal:        Right hip: She exhibits tenderness. She exhibits no bony tenderness and no swelling.        Left hip: She exhibits tenderness. She exhibits no bony tenderness and no swelling.        Lumbar back: Normal.        Legs:  R>L   Neurological: She is alert and oriented to person, place, and time. She has normal strength. She exhibits normal muscle tone. Gait (antalgic, ambulatory with cane) abnormal.   Grossly intact   Skin: Skin is warm, dry and intact. No rash noted. No erythema.   Psychiatric: She has a normal mood and affect. Her speech is normal and behavior is normal. Judgment and thought content normal. Cognition and memory are normal.   Nursing note and vitals reviewed.      Assessment:       1. Bilateral sciatica        Plan:       1. Bilateral sciatica  No active back pain today, no acute neurovascular deficit or changes, chronic degenerative arthritis exacerbated by physical immobility,  NSAIDs and steroids contraindicated with pt current condition, continue home treatment  Symptomatic therapy suggested: OTC topical analgesics, apply heating pad (don't sleep on pad), avoid aspirin and NSAID's and call prn if symptoms persist or worsen.   - acetaminophen (TYLENOL) 650 MG TbSR; Take 1 tablet (650 mg total) by mouth every 8  (eight) hours.  Dispense: 30 tablet; Refill: 0      CLAYTON Grover, FNP-C  Family/Internal Medicine  [facility]

## 2019-07-08 NOTE — TELEPHONE ENCOUNTER
Requested Prescriptions     Pending Prescriptions Disp Refills    lisinopril 10 MG tablet 90 tablet 1     Sig: Take 1 tablet (10 mg total) by mouth once daily.   [facility]

## 2019-07-11 PROBLEM — S72.001A CLOSED FRACTURE OF NECK OF RIGHT FEMUR: Status: ACTIVE | Noted: 2019-01-01

## 2019-07-11 NOTE — ED PROVIDER NOTES
[facility]                                                 Chief Complaint  89 y.o. female with Fall (right hip pain)    History of Present Illness  Chanda Kaufman presents to the emergency room with right hip pain today  Patient has right hip pain on range of motion with obvious leg shortening  Patient has a hip fracture diagnosed on CT of the pelvis, mild displacement  Patient has femoral neck fracture with no obvious other trauma reported  Patient had no head trauma or loss of consciousness, consult orthopedics    The history is provided by the patient   device was not used during this ER visit  Medical history: Anxiety, breast cancer, HLD, HTN, osteoporosis, PVD  Surgeries: Cholecystectomy, hysterectomy, eye surgery, mastectomy  Allergies: Penicillin    I have reviewed all of this patient's past medical, surgical, family, and social   histories as well as active allergies and medications documented in the  electronic medical record    Review of Systems and Physical Exam      Review of Systems  -- Constitution - no fever, denies fatigue, no weakness, no chills  -- Eyes - no tearing or redness, no visual disturbance  -- Ear, Nose - no tinnitus or earache, no nasal congestion or discharge  -- Mouth,Throat - no sore throat, no toothache, normal voice, normal swallowing  -- Respiratory - denies cough and congestion, no shortness of breath, no BATEMAN  -- Cardiovascular - denies chest pain, no palpitations, denies claudication  -- Gastrointestinal - denies abdominal pain, nausea, vomiting, or diarrhea  -- Genitourinary - no dysuria, denies flank pain, no hematuria, no STD risk  -- Musculoskeletal - right hip pain after fall today  -- Neurological - no headache, denies weakness or seizure; no LOC  -- Skin - denies pallor, rash, or changes in skin. no hives or welts noted    Vital Signs  Her tympanic temperature is 98.7 °F (37.1 °C).   Her blood pressure is 125/60 and her pulse is  78.   Her respiration is 20 and oxygen saturation is 95%.     Physical Exam  -- Nursing note and vitals reviewed  -- Constitutional: Appears well-developed and well-nourished  -- Head: Atraumatic. Normocephalic. No obvious abnormality  -- Eyes: Pupils are equal and reactive to light. Normal conjunctiva and lids  -- Neck: Normal range of motion. Neck supple. No masses, trachea midline  -- Cardiac: Normal rate, regular rhythm and normal heart sounds  -- Pulmonary: Normal respiratory effort, breath sounds clear to auscultation  -- Abdominal: Soft, no tenderness. Normal bowel sounds. Normal liver edge  -- Musculoskeletal: Right hip pain with range of motion and leg shortening  -- Neurological: No focal deficits. Showed good interaction with staff  -- Vascular: Posterior tibial, dorsalis pedis and radial pulses 2+ bilaterally      Emergency Room Course      CT pelvis  Comminuted and mildly displaced right femoral neck fracture.  No other acute osseous abnormalities are seen.     Medications Given  morphine injection 1 mg (1 mg Intravenous Given 7/11/19 1826)   ondansetron injection 4 mg (4 mg Intravenous Given 7/11/19 1826)     MDM  Number of Diagnoses or Management Options  Closed displaced fracture of right femoral neck: new, needed workup  Right hip pain: new, needed workup     Amount and/or Complexity of Data Reviewed  Tests in the radiology section of CPT®: ordered and reviewed    Risk of Complications, Morbidity, and/or Mortality  Presenting problems: high  Diagnostic procedures: high  Management options: high       ED Physician Management  -- Diagnosis management comments: 89 y.o. female with right hip pain  -- patient has right hip pain after fall with a femoral neck fracture noted  -- patient will be transferred to [place] for orthopedic evaluation    Diagnosis  -- Closed displaced fracture of right femoral neck.   -- A diagnosis of Right hip pain was also pertinent to this visit.    Disposition and Plan  --  Disposition: transfer  -- Condition: stable    This note is dictated on M*Modal word recognition program.  There are word recognition mistakes that are occasionally missed on review.         Scott Cifuentes MD  07/11/19 2738

## 2019-07-12 PROBLEM — I73.9 PAD (PERIPHERAL ARTERY DISEASE): Status: ACTIVE | Noted: 2019-01-01

## 2019-07-12 NOTE — ED NOTES
Call from jocelyne at transfer center. Pt accepted by dr mccatrhy at [facility]. Transfer center to arrange aasi transportation. Pt and family updated on plan of care.

## 2019-08-02 PROBLEM — K92.2 GI BLEED: Status: ACTIVE | Noted: 2019-01-01

## 2019-08-03 PROBLEM — E63.9 INADEQUATE DIETARY ENERGY INTAKE: Status: ACTIVE | Noted: 2019-01-01

## 2019-08-04 PROBLEM — R10.9 ABDOMINAL PAIN: Status: ACTIVE | Noted: 2019-01-01

## 2019-08-05 PROBLEM — T14.8XXA BLISTER: Status: ACTIVE | Noted: 2019-01-01

## 2019-08-05 PROBLEM — B96.20 E-COLI UTI: Status: ACTIVE | Noted: 2019-01-01

## 2019-08-05 PROBLEM — K57.92 DIVERTICULITIS: Status: ACTIVE | Noted: 2019-01-01

## 2019-08-05 PROBLEM — L89.93 PRESSURE INJURY, STAGE 3: Status: ACTIVE | Noted: 2019-01-01

## 2019-08-05 PROBLEM — N39.0 E-COLI UTI: Status: ACTIVE | Noted: 2019-01-01

## 2019-10-07 NOTE — TELEPHONE ENCOUNTER
Requested Prescriptions     Pending Prescriptions Disp Refills    ALPRAZolam (XANAX) 0.5 MG tablet 60 tablet 0     Sig: Take 1 tablet (0.5 mg total) by mouth 2 (two) times daily as needed. 30 day supply only. Appointment needed. Thanks   [facility] pharmacy. LOV: 3/20/19. 30 day supply pended. Appt needed. Thanks.

## 2020-01-01 ENCOUNTER — HOSPITAL ENCOUNTER (INPATIENT)
Facility: HOSPITAL | Age: 85
LOS: 4 days | DRG: 951 | End: 2020-04-28
Attending: FAMILY MEDICINE | Admitting: FAMILY MEDICINE
Payer: OTHER MISCELLANEOUS

## 2020-01-01 ENCOUNTER — HOSPITAL ENCOUNTER (INPATIENT)
Facility: HOSPITAL | Age: 85
LOS: 1 days | Discharge: HOSPICE | DRG: 177 | End: 2020-04-24
Attending: SURGERY | Admitting: FAMILY MEDICINE
Payer: MEDICARE

## 2020-01-01 ENCOUNTER — NURSE TRIAGE (OUTPATIENT)
Dept: ADMINISTRATIVE | Facility: CLINIC | Age: 85
End: 2020-01-01

## 2020-01-01 ENCOUNTER — TELEPHONE (OUTPATIENT)
Dept: FAMILY MEDICINE | Facility: CLINIC | Age: 85
End: 2020-01-01

## 2020-01-01 VITALS
RESPIRATION RATE: 20 BRPM | BODY MASS INDEX: 24.97 KG/M2 | HEIGHT: 61 IN | OXYGEN SATURATION: 94 % | WEIGHT: 132.25 LBS | DIASTOLIC BLOOD PRESSURE: 61 MMHG | SYSTOLIC BLOOD PRESSURE: 132 MMHG | TEMPERATURE: 96 F

## 2020-01-01 VITALS
BODY MASS INDEX: 25.05 KG/M2 | SYSTOLIC BLOOD PRESSURE: 93 MMHG | DIASTOLIC BLOOD PRESSURE: 48 MMHG | HEART RATE: 119 BPM | WEIGHT: 132.69 LBS | TEMPERATURE: 96 F | OXYGEN SATURATION: 94 % | HEIGHT: 61 IN | RESPIRATION RATE: 20 BRPM

## 2020-01-01 DIAGNOSIS — Z51.5 HOSPICE CARE PATIENT: ICD-10-CM

## 2020-01-01 DIAGNOSIS — J96.90 RESPIRATORY FAILURE, UNSPECIFIED CHRONICITY, UNSPECIFIED WHETHER WITH HYPOXIA OR HYPERCAPNIA: ICD-10-CM

## 2020-01-01 DIAGNOSIS — U07.1 COVID-19 VIRUS INFECTION: ICD-10-CM

## 2020-01-01 DIAGNOSIS — J96.90 RESPIRATORY FAILURE, UNSPECIFIED CHRONICITY, UNSPECIFIED WHETHER WITH HYPOXIA OR HYPERCAPNIA: Primary | ICD-10-CM

## 2020-01-01 LAB
ALBUMIN SERPL BCP-MCNC: 1.9 G/DL (ref 3.5–5.2)
ALP SERPL-CCNC: 86 U/L (ref 55–135)
ALT SERPL W/O P-5'-P-CCNC: 20 U/L (ref 10–44)
AMORPH CRY URNS QL MICRO: ABNORMAL
ANION GAP SERPL CALC-SCNC: 11 MMOL/L (ref 8–16)
APTT BLDCRRT: 36.2 SEC (ref 21–32)
AST SERPL-CCNC: 37 U/L (ref 10–40)
BACTERIA #/AREA URNS HPF: ABNORMAL /HPF
BACTERIA BLD CULT: NORMAL
BACTERIA BLD CULT: NORMAL
BASOPHILS # BLD AUTO: 0.02 K/UL (ref 0–0.2)
BASOPHILS NFR BLD: 0.2 % (ref 0–1.9)
BILIRUB SERPL-MCNC: 0.6 MG/DL (ref 0.1–1)
BILIRUB UR QL STRIP: NEGATIVE
BNP SERPL-MCNC: 150 PG/ML (ref 0–99)
BUN SERPL-MCNC: 36 MG/DL (ref 10–30)
CALCIUM SERPL-MCNC: 8.1 MG/DL (ref 8.7–10.5)
CHLORIDE SERPL-SCNC: 97 MMOL/L (ref 95–110)
CK MB SERPL-MCNC: 0.5 NG/ML (ref 0.1–6.5)
CK MB SERPL-RTO: 3.3 % (ref 0–5)
CK SERPL-CCNC: 15 U/L (ref 20–180)
CK SERPL-CCNC: 15 U/L (ref 20–180)
CLARITY UR: CLEAR
CO2 SERPL-SCNC: 29 MMOL/L (ref 23–29)
COLOR UR: YELLOW
CREAT SERPL-MCNC: 1.6 MG/DL (ref 0.5–1.4)
CRP SERPL-MCNC: 288.7 MG/L (ref 0–8.2)
D DIMER PPP IA.FEU-MCNC: 2.08 MG/L FEU
DIFFERENTIAL METHOD: ABNORMAL
EOSINOPHIL # BLD AUTO: 0 K/UL (ref 0–0.5)
EOSINOPHIL NFR BLD: 0 % (ref 0–8)
ERYTHROCYTE [DISTWIDTH] IN BLOOD BY AUTOMATED COUNT: 13.5 % (ref 11.5–14.5)
ERYTHROCYTE [SEDIMENTATION RATE] IN BLOOD BY WESTERGREN METHOD: 86 MM/HR (ref 0–20)
EST. GFR  (AFRICAN AMERICAN): 32 ML/MIN/1.73 M^2
EST. GFR  (NON AFRICAN AMERICAN): 28 ML/MIN/1.73 M^2
FERRITIN SERPL-MCNC: 501 NG/ML (ref 20–300)
GLUCOSE SERPL-MCNC: 218 MG/DL (ref 70–110)
GLUCOSE UR QL STRIP: NEGATIVE
GROUP A STREP, MOLECULAR: NEGATIVE
HCT VFR BLD AUTO: 36 % (ref 37–48.5)
HGB BLD-MCNC: 11.6 G/DL (ref 12–16)
HGB UR QL STRIP: NEGATIVE
HYALINE CASTS #/AREA URNS LPF: 0 /LPF
IMM GRANULOCYTES # BLD AUTO: 0.06 K/UL (ref 0–0.04)
IMM GRANULOCYTES NFR BLD AUTO: 0.6 % (ref 0–0.5)
INFLUENZA A, MOLECULAR: NEGATIVE
INFLUENZA B, MOLECULAR: NEGATIVE
INR PPP: 1.1 (ref 0.8–1.2)
KETONES UR QL STRIP: NEGATIVE
LACTATE SERPL-SCNC: 3.8 MMOL/L (ref 0.5–2.2)
LDH SERPL L TO P-CCNC: 466 U/L (ref 110–260)
LEUKOCYTE ESTERASE UR QL STRIP: NEGATIVE
LYMPHOCYTES # BLD AUTO: 0.4 K/UL (ref 1–4.8)
LYMPHOCYTES NFR BLD: 4.3 % (ref 18–48)
MCH RBC QN AUTO: 30.9 PG (ref 27–31)
MCHC RBC AUTO-ENTMCNC: 32.2 G/DL (ref 32–36)
MCV RBC AUTO: 96 FL (ref 82–98)
MICROSCOPIC COMMENT: ABNORMAL
MONOCYTES # BLD AUTO: 0.4 K/UL (ref 0.3–1)
MONOCYTES NFR BLD: 4.2 % (ref 4–15)
NEUTROPHILS # BLD AUTO: 9.1 K/UL (ref 1.8–7.7)
NEUTROPHILS NFR BLD: 90.7 % (ref 38–73)
NITRITE UR QL STRIP: NEGATIVE
NRBC BLD-RTO: 0 /100 WBC
PH UR STRIP: 5 [PH] (ref 5–8)
PLATELET # BLD AUTO: 181 K/UL (ref 150–350)
PMV BLD AUTO: 10.4 FL (ref 9.2–12.9)
POTASSIUM SERPL-SCNC: 3.2 MMOL/L (ref 3.5–5.1)
PROCALCITONIN SERPL IA-MCNC: 0.38 NG/ML
PROT SERPL-MCNC: 5.9 G/DL (ref 6–8.4)
PROT UR QL STRIP: ABNORMAL
PROTHROMBIN TIME: 12.1 SEC (ref 9–12.5)
RBC # BLD AUTO: 3.76 M/UL (ref 4–5.4)
RBC #/AREA URNS HPF: 5 /HPF (ref 0–4)
SARS-COV-2 RDRP RESP QL NAA+PROBE: POSITIVE
SODIUM SERPL-SCNC: 137 MMOL/L (ref 136–145)
SP GR UR STRIP: <=1.005 (ref 1–1.03)
SPECIMEN SOURCE: NORMAL
TROPONIN I SERPL DL<=0.01 NG/ML-MCNC: 0.01 NG/ML (ref 0–0.03)
URN SPEC COLLECT METH UR: ABNORMAL
UROBILINOGEN UR STRIP-ACNC: NEGATIVE EU/DL
WBC # BLD AUTO: 10.02 K/UL (ref 3.9–12.7)
WBC #/AREA URNS HPF: 5 /HPF (ref 0–5)

## 2020-01-01 PROCEDURE — 84484 ASSAY OF TROPONIN QUANT: CPT

## 2020-01-01 PROCEDURE — 63600175 PHARM REV CODE 636 W HCPCS: Performed by: FAMILY MEDICINE

## 2020-01-01 PROCEDURE — 36415 COLL VENOUS BLD VENIPUNCTURE: CPT

## 2020-01-01 PROCEDURE — 25000003 PHARM REV CODE 250: Performed by: SURGERY

## 2020-01-01 PROCEDURE — 94660 CPAP INITIATION&MGMT: CPT

## 2020-01-01 PROCEDURE — 99232 PR SUBSEQUENT HOSPITAL CARE,LEVL II: ICD-10-PCS | Mod: CR,,, | Performed by: FAMILY MEDICINE

## 2020-01-01 PROCEDURE — 87651 STREP A DNA AMP PROBE: CPT

## 2020-01-01 PROCEDURE — 11000001 HC ACUTE MED/SURG PRIVATE ROOM

## 2020-01-01 PROCEDURE — 87502 INFLUENZA DNA AMP PROBE: CPT

## 2020-01-01 PROCEDURE — 82728 ASSAY OF FERRITIN: CPT

## 2020-01-01 PROCEDURE — 93010 ELECTROCARDIOGRAM REPORT: CPT | Mod: ,,, | Performed by: INTERNAL MEDICINE

## 2020-01-01 PROCEDURE — 82550 ASSAY OF CK (CPK): CPT

## 2020-01-01 PROCEDURE — 94761 N-INVAS EAR/PLS OXIMETRY MLT: CPT

## 2020-01-01 PROCEDURE — 63600175 PHARM REV CODE 636 W HCPCS: Performed by: SURGERY

## 2020-01-01 PROCEDURE — 93005 ELECTROCARDIOGRAM TRACING: CPT

## 2020-01-01 PROCEDURE — 25000003 PHARM REV CODE 250: Performed by: FAMILY MEDICINE

## 2020-01-01 PROCEDURE — 83615 LACTATE (LD) (LDH) ENZYME: CPT

## 2020-01-01 PROCEDURE — 27000221 HC OXYGEN, UP TO 24 HOURS

## 2020-01-01 PROCEDURE — 99291 CRITICAL CARE FIRST HOUR: CPT | Mod: 25

## 2020-01-01 PROCEDURE — 82553 CREATINE MB FRACTION: CPT

## 2020-01-01 PROCEDURE — 85651 RBC SED RATE NONAUTOMATED: CPT

## 2020-01-01 PROCEDURE — 94760 N-INVAS EAR/PLS OXIMETRY 1: CPT

## 2020-01-01 PROCEDURE — 84145 PROCALCITONIN (PCT): CPT

## 2020-01-01 PROCEDURE — 85025 COMPLETE CBC W/AUTO DIFF WBC: CPT

## 2020-01-01 PROCEDURE — 99234 PR OBSERV/HOSP SAME DATE,LEVL III: ICD-10-PCS | Mod: ,,, | Performed by: FAMILY MEDICINE

## 2020-01-01 PROCEDURE — 99900035 HC TECH TIME PER 15 MIN (STAT)

## 2020-01-01 PROCEDURE — 96374 THER/PROPH/DIAG INJ IV PUSH: CPT

## 2020-01-01 PROCEDURE — 96375 TX/PRO/DX INJ NEW DRUG ADDON: CPT

## 2020-01-01 PROCEDURE — 86140 C-REACTIVE PROTEIN: CPT

## 2020-01-01 PROCEDURE — 83880 ASSAY OF NATRIURETIC PEPTIDE: CPT

## 2020-01-01 PROCEDURE — 81000 URINALYSIS NONAUTO W/SCOPE: CPT

## 2020-01-01 PROCEDURE — 99234 HOSP IP/OBS SM DT SF/LOW 45: CPT | Mod: ,,, | Performed by: FAMILY MEDICINE

## 2020-01-01 PROCEDURE — 85610 PROTHROMBIN TIME: CPT

## 2020-01-01 PROCEDURE — U0002 COVID-19 LAB TEST NON-CDC: HCPCS

## 2020-01-01 PROCEDURE — 27000190 HC CPAP FULL FACE MASK W/VALVE

## 2020-01-01 PROCEDURE — 83605 ASSAY OF LACTIC ACID: CPT

## 2020-01-01 PROCEDURE — 85379 FIBRIN DEGRADATION QUANT: CPT

## 2020-01-01 PROCEDURE — 80053 COMPREHEN METABOLIC PANEL: CPT

## 2020-01-01 PROCEDURE — 85730 THROMBOPLASTIN TIME PARTIAL: CPT

## 2020-01-01 PROCEDURE — 93010 EKG 12-LEAD: ICD-10-PCS | Mod: ,,, | Performed by: INTERNAL MEDICINE

## 2020-01-01 PROCEDURE — 87040 BLOOD CULTURE FOR BACTERIA: CPT | Mod: 59

## 2020-01-01 PROCEDURE — 99232 SBSQ HOSP IP/OBS MODERATE 35: CPT | Mod: CR,,, | Performed by: FAMILY MEDICINE

## 2020-01-01 RX ORDER — SODIUM CHLORIDE 0.9 % (FLUSH) 0.9 %
10 SYRINGE (ML) INJECTION
Status: DISCONTINUED | OUTPATIENT
Start: 2020-01-01 | End: 2020-01-01 | Stop reason: HOSPADM

## 2020-01-01 RX ORDER — SODIUM CHLORIDE 9 MG/ML
1000 INJECTION, SOLUTION INTRAVENOUS
Status: COMPLETED | OUTPATIENT
Start: 2020-01-01 | End: 2020-01-01

## 2020-01-01 RX ORDER — ONDANSETRON 2 MG/ML
4 INJECTION INTRAMUSCULAR; INTRAVENOUS
Status: COMPLETED | OUTPATIENT
Start: 2020-01-01 | End: 2020-01-01

## 2020-01-01 RX ORDER — SIMVASTATIN 40 MG/1
40 TABLET, FILM COATED ORAL NIGHTLY
Status: DISCONTINUED | OUTPATIENT
Start: 2020-01-01 | End: 2020-01-01

## 2020-01-01 RX ORDER — GABAPENTIN 300 MG/1
300 CAPSULE ORAL DAILY
Status: DISCONTINUED | OUTPATIENT
Start: 2020-01-01 | End: 2020-01-01

## 2020-01-01 RX ORDER — LEVOTHYROXINE SODIUM 50 UG/1
50 TABLET ORAL
Status: DISCONTINUED | OUTPATIENT
Start: 2020-01-01 | End: 2020-01-01 | Stop reason: HOSPADM

## 2020-01-01 RX ORDER — MORPHINE SULFATE 2 MG/ML
2 INJECTION, SOLUTION INTRAMUSCULAR; INTRAVENOUS
Status: DISCONTINUED | OUTPATIENT
Start: 2020-01-01 | End: 2020-01-01 | Stop reason: HOSPADM

## 2020-01-01 RX ORDER — ASCORBIC ACID 500 MG
500 TABLET ORAL 2 TIMES DAILY
Status: CANCELLED | OUTPATIENT
Start: 2020-01-01

## 2020-01-01 RX ORDER — TALC
6 POWDER (GRAM) TOPICAL NIGHTLY
Status: CANCELLED | OUTPATIENT
Start: 2020-01-01

## 2020-01-01 RX ORDER — MONTELUKAST SODIUM 10 MG/1
10 TABLET ORAL DAILY
Status: CANCELLED | OUTPATIENT
Start: 2020-01-01

## 2020-01-01 RX ORDER — LEVOFLOXACIN 5 MG/ML
750 INJECTION, SOLUTION INTRAVENOUS ONCE
Status: COMPLETED | OUTPATIENT
Start: 2020-01-01 | End: 2020-01-01

## 2020-01-01 RX ORDER — MORPHINE SULFATE 2 MG/ML
2 INJECTION, SOLUTION INTRAMUSCULAR; INTRAVENOUS
Status: COMPLETED | OUTPATIENT
Start: 2020-01-01 | End: 2020-01-01

## 2020-01-01 RX ORDER — ALPRAZOLAM 0.5 MG/1
0.5 TABLET ORAL 2 TIMES DAILY PRN
Status: DISCONTINUED | OUTPATIENT
Start: 2020-01-01 | End: 2020-01-01 | Stop reason: HOSPADM

## 2020-01-01 RX ORDER — LORAZEPAM 2 MG/ML
1 INJECTION INTRAMUSCULAR
Status: DISCONTINUED | OUTPATIENT
Start: 2020-01-01 | End: 2020-01-01 | Stop reason: HOSPADM

## 2020-01-01 RX ORDER — HYDROXYCHLOROQUINE SULFATE 200 MG/1
200 TABLET, FILM COATED ORAL DAILY
Status: DISCONTINUED | OUTPATIENT
Start: 2020-01-01 | End: 2020-01-01

## 2020-01-01 RX ORDER — LORAZEPAM 2 MG/ML
2 INJECTION INTRAMUSCULAR
Status: DISCONTINUED | OUTPATIENT
Start: 2020-01-01 | End: 2020-01-01 | Stop reason: HOSPADM

## 2020-01-01 RX ORDER — TALC
6 POWDER (GRAM) TOPICAL NIGHTLY
Status: DISCONTINUED | OUTPATIENT
Start: 2020-01-01 | End: 2020-01-01 | Stop reason: HOSPADM

## 2020-01-01 RX ORDER — SODIUM CHLORIDE 9 MG/ML
INJECTION, SOLUTION INTRAVENOUS CONTINUOUS
Status: CANCELLED | OUTPATIENT
Start: 2020-01-01

## 2020-01-01 RX ORDER — SODIUM CHLORIDE 0.9 % (FLUSH) 0.9 %
10 SYRINGE (ML) INJECTION
Status: CANCELLED | OUTPATIENT
Start: 2020-01-01

## 2020-01-01 RX ORDER — ASCORBIC ACID 500 MG
500 TABLET ORAL DAILY
Status: DISCONTINUED | OUTPATIENT
Start: 2020-01-01 | End: 2020-01-01

## 2020-01-01 RX ORDER — LEVOFLOXACIN 5 MG/ML
500 INJECTION, SOLUTION INTRAVENOUS
Status: DISCONTINUED | OUTPATIENT
Start: 2020-01-01 | End: 2020-01-01 | Stop reason: HOSPADM

## 2020-01-01 RX ORDER — PANTOPRAZOLE SODIUM 40 MG/1
40 TABLET, DELAYED RELEASE ORAL DAILY
Status: DISCONTINUED | OUTPATIENT
Start: 2020-01-01 | End: 2020-01-01

## 2020-01-01 RX ORDER — LEVOTHYROXINE SODIUM 50 UG/1
50 TABLET ORAL
Status: CANCELLED | OUTPATIENT
Start: 2020-01-01

## 2020-01-01 RX ORDER — ONDANSETRON 2 MG/ML
4 INJECTION INTRAMUSCULAR; INTRAVENOUS EVERY 8 HOURS PRN
Status: CANCELLED | OUTPATIENT
Start: 2020-01-01

## 2020-01-01 RX ORDER — LEVOFLOXACIN 5 MG/ML
500 INJECTION, SOLUTION INTRAVENOUS
Status: DISCONTINUED | OUTPATIENT
Start: 2020-01-01 | End: 2020-01-01

## 2020-01-01 RX ORDER — PANTOPRAZOLE SODIUM 40 MG/1
40 TABLET, DELAYED RELEASE ORAL DAILY
Status: DISCONTINUED | OUTPATIENT
Start: 2020-01-01 | End: 2020-01-01 | Stop reason: HOSPADM

## 2020-01-01 RX ORDER — TALC
6 POWDER (GRAM) TOPICAL NIGHTLY
Status: DISCONTINUED | OUTPATIENT
Start: 2020-01-01 | End: 2020-01-01

## 2020-01-01 RX ORDER — GABAPENTIN 300 MG/1
300 CAPSULE ORAL DAILY
Status: CANCELLED | OUTPATIENT
Start: 2020-01-01

## 2020-01-01 RX ORDER — SODIUM CHLORIDE 9 MG/ML
INJECTION, SOLUTION INTRAVENOUS CONTINUOUS
Status: DISCONTINUED | OUTPATIENT
Start: 2020-01-01 | End: 2020-01-01

## 2020-01-01 RX ORDER — ASCORBIC ACID 500 MG
500 TABLET ORAL 2 TIMES DAILY
Status: DISCONTINUED | OUTPATIENT
Start: 2020-01-01 | End: 2020-01-01

## 2020-01-01 RX ORDER — SIMVASTATIN 40 MG/1
40 TABLET, FILM COATED ORAL NIGHTLY
Status: CANCELLED | OUTPATIENT
Start: 2020-01-01

## 2020-01-01 RX ORDER — MONTELUKAST SODIUM 10 MG/1
10 TABLET ORAL DAILY
Status: DISCONTINUED | OUTPATIENT
Start: 2020-01-01 | End: 2020-01-01 | Stop reason: HOSPADM

## 2020-01-01 RX ORDER — GABAPENTIN 300 MG/1
300 CAPSULE ORAL DAILY
Status: DISCONTINUED | OUTPATIENT
Start: 2020-01-01 | End: 2020-01-01 | Stop reason: HOSPADM

## 2020-01-01 RX ORDER — SIMVASTATIN 40 MG/1
40 TABLET, FILM COATED ORAL NIGHTLY
Status: DISCONTINUED | OUTPATIENT
Start: 2020-01-01 | End: 2020-01-01 | Stop reason: HOSPADM

## 2020-01-01 RX ORDER — MONTELUKAST SODIUM 10 MG/1
10 TABLET ORAL DAILY
Status: DISCONTINUED | OUTPATIENT
Start: 2020-01-01 | End: 2020-01-01

## 2020-01-01 RX ORDER — PANTOPRAZOLE SODIUM 40 MG/1
40 TABLET, DELAYED RELEASE ORAL DAILY
Status: CANCELLED | OUTPATIENT
Start: 2020-01-01

## 2020-01-01 RX ORDER — LEVOFLOXACIN 5 MG/ML
500 INJECTION, SOLUTION INTRAVENOUS
Status: CANCELLED | OUTPATIENT
Start: 2020-01-01

## 2020-01-01 RX ORDER — ALPRAZOLAM 0.5 MG/1
0.5 TABLET ORAL 2 TIMES DAILY PRN
Status: CANCELLED | OUTPATIENT
Start: 2020-01-01

## 2020-01-01 RX ORDER — HYDROXYCHLOROQUINE SULFATE 200 MG/1
200 TABLET, FILM COATED ORAL 2 TIMES DAILY
Status: DISCONTINUED | OUTPATIENT
Start: 2020-01-01 | End: 2020-01-01

## 2020-01-01 RX ORDER — ONDANSETRON 2 MG/ML
4 INJECTION INTRAMUSCULAR; INTRAVENOUS EVERY 8 HOURS PRN
Status: DISCONTINUED | OUTPATIENT
Start: 2020-01-01 | End: 2020-01-01 | Stop reason: HOSPADM

## 2020-01-01 RX ORDER — ASCORBIC ACID 500 MG
1000 TABLET ORAL 2 TIMES DAILY
Status: DISCONTINUED | OUTPATIENT
Start: 2020-01-01 | End: 2020-01-01 | Stop reason: HOSPADM

## 2020-01-01 RX ORDER — MORPHINE SULFATE 2 MG/ML
2 INJECTION, SOLUTION INTRAMUSCULAR; INTRAVENOUS
Status: CANCELLED | OUTPATIENT
Start: 2020-01-01

## 2020-01-01 RX ORDER — LORAZEPAM 2 MG/ML
2 INJECTION INTRAMUSCULAR
Status: CANCELLED | OUTPATIENT
Start: 2020-01-01

## 2020-01-01 RX ORDER — LEVOTHYROXINE SODIUM 50 UG/1
50 TABLET ORAL
Status: DISCONTINUED | OUTPATIENT
Start: 2020-01-01 | End: 2020-01-01

## 2020-01-01 RX ADMIN — MORPHINE SULFATE 2 MG: 2 INJECTION, SOLUTION INTRAMUSCULAR; INTRAVENOUS at 07:04

## 2020-01-01 RX ADMIN — LORAZEPAM 2 MG: 2 INJECTION INTRAMUSCULAR; INTRAVENOUS at 07:04

## 2020-01-01 RX ADMIN — MORPHINE SULFATE 2 MG: 2 INJECTION, SOLUTION INTRAMUSCULAR; INTRAVENOUS at 05:04

## 2020-01-01 RX ADMIN — MORPHINE SULFATE 2 MG: 2 INJECTION, SOLUTION INTRAMUSCULAR; INTRAVENOUS at 02:04

## 2020-01-01 RX ADMIN — MORPHINE SULFATE 2 MG: 2 INJECTION, SOLUTION INTRAMUSCULAR; INTRAVENOUS at 03:04

## 2020-01-01 RX ADMIN — MORPHINE SULFATE 2 MG: 2 INJECTION, SOLUTION INTRAMUSCULAR; INTRAVENOUS at 12:04

## 2020-01-01 RX ADMIN — MORPHINE SULFATE 2 MG: 2 INJECTION, SOLUTION INTRAMUSCULAR; INTRAVENOUS at 08:04

## 2020-01-01 RX ADMIN — MORPHINE SULFATE 2 MG: 2 INJECTION, SOLUTION INTRAMUSCULAR; INTRAVENOUS at 10:04

## 2020-01-01 RX ADMIN — LORAZEPAM 2 MG: 2 INJECTION INTRAMUSCULAR; INTRAVENOUS at 09:04

## 2020-01-01 RX ADMIN — SIMVASTATIN 40 MG: 40 TABLET, FILM COATED ORAL at 09:04

## 2020-01-01 RX ADMIN — LEVOFLOXACIN 750 MG: 750 INJECTION, SOLUTION INTRAVENOUS at 03:04

## 2020-01-01 RX ADMIN — LEVOFLOXACIN 500 MG: 500 INJECTION, SOLUTION INTRAVENOUS at 07:04

## 2020-01-01 RX ADMIN — MORPHINE SULFATE 2 MG: 2 INJECTION, SOLUTION INTRAMUSCULAR; INTRAVENOUS at 09:04

## 2020-01-01 RX ADMIN — MORPHINE SULFATE 2 MG: 2 INJECTION, SOLUTION INTRAMUSCULAR; INTRAVENOUS at 06:04

## 2020-01-01 RX ADMIN — MORPHINE SULFATE 2 MG: 2 INJECTION, SOLUTION INTRAMUSCULAR; INTRAVENOUS at 01:04

## 2020-01-01 RX ADMIN — LORAZEPAM 2 MG: 2 INJECTION INTRAMUSCULAR; INTRAVENOUS at 11:04

## 2020-01-01 RX ADMIN — ALPRAZOLAM 0.5 MG: 0.5 TABLET ORAL at 09:04

## 2020-01-01 RX ADMIN — LORAZEPAM 2 MG: 2 INJECTION INTRAMUSCULAR; INTRAVENOUS at 06:04

## 2020-01-01 RX ADMIN — LORAZEPAM 2 MG: 2 INJECTION INTRAMUSCULAR; INTRAVENOUS at 02:04

## 2020-01-01 RX ADMIN — ONDANSETRON 4 MG: 2 INJECTION INTRAMUSCULAR; INTRAVENOUS at 02:04

## 2020-01-01 RX ADMIN — LORAZEPAM 2 MG: 2 INJECTION INTRAMUSCULAR; INTRAVENOUS at 10:04

## 2020-01-01 RX ADMIN — MORPHINE SULFATE 2 MG: 2 INJECTION, SOLUTION INTRAMUSCULAR; INTRAVENOUS at 11:04

## 2020-01-01 RX ADMIN — LORAZEPAM 2 MG: 2 INJECTION INTRAMUSCULAR; INTRAVENOUS at 01:04

## 2020-01-01 RX ADMIN — Medication 6 MG: at 09:04

## 2020-01-01 RX ADMIN — SODIUM CHLORIDE 1000 ML: 0.9 INJECTION, SOLUTION INTRAVENOUS at 02:04

## 2020-01-01 RX ADMIN — LORAZEPAM 2 MG: 2 INJECTION INTRAMUSCULAR; INTRAVENOUS at 03:04

## 2020-01-01 RX ADMIN — LORAZEPAM 2 MG: 2 INJECTION INTRAMUSCULAR; INTRAVENOUS at 05:04

## 2020-01-01 RX ADMIN — MORPHINE SULFATE 2 MG: 2 INJECTION, SOLUTION INTRAMUSCULAR; INTRAVENOUS at 04:04

## 2020-04-24 PROBLEM — K92.2 GI BLEED: Status: RESOLVED | Noted: 2019-01-01 | Resolved: 2020-01-01

## 2020-04-24 PROBLEM — J96.90 RESPIRATORY FAILURE: Status: ACTIVE | Noted: 2020-01-01

## 2020-04-24 PROBLEM — E63.9 INADEQUATE DIETARY ENERGY INTAKE: Status: RESOLVED | Noted: 2019-01-01 | Resolved: 2020-01-01

## 2020-04-24 PROBLEM — J96.01 ACUTE HYPOXEMIC RESPIRATORY FAILURE: Status: ACTIVE | Noted: 2020-01-01

## 2020-04-24 PROBLEM — S72.001A CLOSED FRACTURE OF NECK OF RIGHT FEMUR: Status: RESOLVED | Noted: 2019-01-01 | Resolved: 2020-01-01

## 2020-04-24 PROBLEM — J12.82 PNEUMONIA DUE TO COVID-19 VIRUS: Status: ACTIVE | Noted: 2020-01-01

## 2020-04-24 PROBLEM — T14.8XXA BLISTER: Status: RESOLVED | Noted: 2019-01-01 | Resolved: 2020-01-01

## 2020-04-24 PROBLEM — N39.0 E-COLI UTI: Status: RESOLVED | Noted: 2019-01-01 | Resolved: 2020-01-01

## 2020-04-24 PROBLEM — U07.1 PNEUMONIA DUE TO COVID-19 VIRUS: Status: ACTIVE | Noted: 2020-01-01

## 2020-04-24 PROBLEM — B96.20 E-COLI UTI: Status: RESOLVED | Noted: 2019-01-01 | Resolved: 2020-01-01

## 2020-04-24 NOTE — ED PROVIDER NOTES
[facility]                                                 Chief Complaint  89 y.o. female with Shortness of Breath and COVID-19 Concerns (positive Covid 3 days ago)    History of Present Illness  Chanda Kaufman presents to the emergency room with shortness of breath  Patient with hypertension shortness of breath and hypoxia on arrival today  Patient is on the verge of respiratory failure, patient is tachypneic on arrival  Patient was diagnosed 3 days ago with COVID at the nursing home then  Family removed her 2 days ago from the nursing home, deterioration since  Patient arrives obviously sick, patient has a DNR on my inspection of chart  Family immediately called, systolic blood pressure is 85, critical patient    The history is provided by the patient   device was not used during this ER visit  Medical history: Anxiety, breast cancer, HLD, HTN, osteoporosis  Surgeries: Gallbladder, eye surgery, hip, hysterectomy mastectomy  Allergies: Penicillin    I have reviewed all of this patient's past medical, surgical, family, and social   histories as well as active allergies and medications documented in the  electronic medical record    Review of Systems and Physical Exam      Review of Systems  -- unable to get any appreciable review of systems    Vital Signs  Her oral temperature is 97.4 °F (36.3 °C).   Her blood pressure is 97/61 and her pulse is 55   Her respiration is 24 and oxygen saturation is 92%     Physical Exam  -- Nursing note and vitals reviewed  -- Constitutional: Cachectic ill white female  -- Head: Atraumatic. Normocephalic. No obvious abnormality  -- Eyes: Pupils are equal and reactive to light. Normal conjunctiva and lids  -- Cardiac: Normal rate, regular rhythm and normal heart sounds  -- Pulmonary: coarse breath sounds in all lung fields  -- Abdominal: Soft, no tenderness. Normal bowel sounds. Normal liver edge  -- Musculoskeletal: Normal range of motion, no  effusions. Joints stable   -- Neurological: No focal deficits. Showed good interaction with staff  -- Vascular: Posterior tibial, dorsalis pedis and radial pulses 2+ bilaterally      Emergency Room Course      Lab Results     K 3.2 (L)   CL 97   CO2 29   BUN 36 (H)   CREATININE 1.6 (H)    (H)   ALKPHOS 86   AST 37   ALT 20   BILITOT 0.6   ALBUMIN 1.9 (L)   PROT 5.9 (L)   WBC 10.02   HGB 11.6 (L)   HCT 36.0 (L)      CPK 15 (L)   CPK 15 (L)   CPKMB 0.5   TROPONINI 0.013   INR 1.1    (H)   DDIMER 2.08 (H)   LACTATE 3.8 (HH)     EKG  -- The EKG findings today were without concerning findings from baseline     CXR  Chronic lung changes are noted.  There is superimposed hazy opacity in the right upper lung and left lung base concerning for an inflammatory/infectious process.     Additional Work up  -- Blood cultures have also been drawn, results are pending  -- rapid Coronavirus PCR in the emergency room was positive  -- CRP, ESR, procalcitonin, LDH and ferritin pending  -- case management & hospice consult in the emergency room    Medications Given  levoFLOXacin 500 mg/100 mL IVPB 500 mg (has no administration in time range)   lorazepam injection 1 mg (has no administration in time range)   ondansetron injection 4 mg (4 mg Intravenous Incomplete 4/24/20 1424)   0.9%  NaCl infusion (1,000 mLs Intravenous New Bag 4/24/20 1424)   morphine injection 2 mg (2 mg Intravenous Given 4/24/20 1423)     ED Physician Management  -- Diagnosis management comments: 89 y.o. female with hypoxia today  -- diagnosis of COVID at the nursing home, family removed her 3 days ago  -- patient has not done well at home over the last 48 hours, came to ER now  -- patient on a Venti mask is barely 90%, tachypneic, in obvious distress  -- I reviewed the patient's [place] and is requesting DNR and comfort measures  -- I talked to the patient's daughter Oxana and granddaughter Edie this pm  -- we are all in universal  agreement that hospice would be the best option  -- pulmonary consult and for BiPAP management for comfort care in the ER  -- case management consult for hospice placement inpatient, grave prognosis    Critical Care ED Physician Time (minutes):  -- Performed by: Scott Cifuentes M.D.  -- Date/Time: 2:45 PM 4/24/2020   -- Direct Patient Care (Face Time): 5  -- Additional History from Records or Taking Additional History: 5  -- Ordering, Reviewing, and Interpreting Diagnostic Studies: 5  -- Total Time in Documentation: 5  -- Consultation with Other Physicians: 5  -- Consultation with Family Related to Condition: 5  -- Total Critical Care Time: 30     Diagnosis  Respiratory failure, unspecified chronicity, unspecified whether with hypoxia or hypercapnia    COVID-19 virus infection    Hospice care patient       Disposition and Plan  -- Disposition: admit to hospice  -- Condition: stable    This note is dictated on M*Modal word recognition program.  There are word recognition mistakes that are occasionally missed on review.         Scott Cifuentes MD  04/24/20 0345

## 2020-04-24 NOTE — CONSULTS
, Enedina Han, CRISTINA, assisting with hospice consult. [facility] has been contacted. Awaiting documentation from ER physician to send referral. Patient may qualify for inpatient hospice at [place] with [facility] pending their evaluation of the patient. If patient does not qualify for inpatient hospice, MD is considering admitting the patient for palliative care.  After testing positive for the COVID virus, the family removed the patient from [facility] but are now unsure if they can handle her care at home.     Milagro Salguero LMSW

## 2020-04-24 NOTE — PLAN OF CARE
04/24/20 1520   ED Admissions Case Approval   ED Admissions Case Approval  Approved       Inpatient criteria met.    LOC:Acute Adult-Infection: Pneumonia by Enedina Han RN         Acute Met: Reviewed on 4/24/2020 by Enedina Han RN         Created Using Review Status Review Entered   InterQual® Completed 4/24/2020 15:19       Criteria Set Name - Subset   LOC:Acute Adult-Infection: Pneumonia       Criteria Review   REVIEW SUMMARY     Patient: YARITZA FLOWER  Review Number: [id number]  Review Status: Completed     Condition Specific: Yes     Condition Level Of Care Code: ACUTE  Condition Level Of Care Description: Acute        OUTCOMES  Outcome Type: Primary  Outcome: Approved  Date/Time: 04- 03:19 PM  Reviewer: Enedina han           REVIEW DETAILS     Product: LOC:Acute Adult  Subset: Infection: Pneumonia      (Symptom or finding within 24h)         (Excludes PO medications unless noted)          [X] Select Day, One:              [X] Episode Day 1, One:                  [X] ACUTE, All:                      [X] Pneumonia confirmed by imaging                      [X] Finding, >= One:                          [X] O2 sat < 89%(0.89) and < baseline                      [X] Intervention, All:                          [X] Anti-infective (includes PO)                          [X] Oxygenation, One:                              [X] O2 sat <= 91%(0.91) and < baseline requiring supplemental oxygen                          [X] Oximetry or blood gas                          [X] Deep vein thrombosis (DVT) prophylaxis or patient ambulatory

## 2020-04-24 NOTE — PLAN OF CARE
04/24/20 5147   Post-Acute Status   Post-Acute Authorization Hospice   Hospice Status Referrals Sent         Faxed Hospice order along with supporting documentation to [facility]. Spoke with Lamar, she will contact family.

## 2020-04-24 NOTE — ED TRIAGE NOTES
PT presents with C/O SOB. Daughter states she was diagnosed with Covid on Tuesday. Pt is a resident of the [facility]

## 2020-04-24 NOTE — HPI
89 year old female was brought to the er by her grand-daughter because of worsening shortness of breath and responsiveness after being brought home from the [facility] with diagnosis of covid pneumonia. Family desires hospice/comfort care and not aggressive care for her pna.

## 2020-04-24 NOTE — PLAN OF CARE
04/24/20 1605   Post-Acute Status   Post-Acute Authorization Hospice   Hospice Status Referrals Sent         Spoke with candelaria Estrada nurse with [facility]  Who states she will be here with the hour to complete assessment of patient for inpatient hospice. CRISTINA Joseph aware.

## 2020-04-24 NOTE — CONSULTS
[facility]  Pulmonology  Consult Note    Patient Name: Chanda Kaufman  MRN: [id number]  Admission Date: 4/24/2020  Hospital Length of Stay: 0 days  Code Status: Prior  Attending Physician: Scott Cifuentes MD  Primary Care Provider: Lucina Carter MD   Principal Problem: <principal problem not specified>    Consults  Subjective:     HPI:  Chanda Kaufman is a 89 y.o. year old female that's presents with a chief complaint of SOB and Wheezing for several days.From NH several days with worsening hypoxia and a cxr consistent with covid viral pneumonia.Acute respiratory failure ++ dehydration with a 1.5 creat .Consulted to evaluate Respiratory status.    Past Medical History:   Diagnosis Date    Anxiety     Breast cancer     Cancer     breast    Hyperlipidemia     Hypertension     Osteoporosis     PVD (peripheral vascular disease)         Past Surgical History:   Procedure Laterality Date    CHOLECYSTECTOMY      EYE SURGERY      HEMIARTHROPLASTY OF HIP Right 7/12/2019    Procedure: HEMIARTHROPLASTY, HIP;  Surgeon: NATALEE Bhatia II, MD;  Location: [facility];  Service: Orthopedics;  Laterality: Right;    HYSTERECTOMY      MASTECTOMY Right        Prior to Admission medications    Medication Sig Start Date End Date Taking? Authorizing Provider   acetaminophen (TYLENOL) 650 MG TbSR Take 1 tablet (650 mg total) by mouth every 8 (eight) hours. 6/28/19   Rosina Larkin NP   ALPRAZolam (XANAX) 0.5 MG tablet Take 1 tablet (0.5 mg total) by mouth 2 (two) times daily as needed. 30 day supply only. Appointment needed. Thanks 10/8/19   Lucina Cartre MD   amino ac/protein hydr/whey pro (LIQUACEL ORAL) Take 30 mLs by mouth 2 (two) times daily.    Historical Provider, MD   ascorbic acid, vitamin C, (VITAMIN C) 500 MG tablet Take 500 mg by mouth 2 (two) times daily.    Historical Provider, MD   calcium-vitamin D3 (OS- + D3) 500 mg(1,250mg) -200 unit per tablet Take 1 tablet by mouth 2  (two) times daily with meals.    Historical Provider, MD   ciprofloxacin HCl (CIPRO) 500 MG tablet Take 1 tablet (500 mg total) by mouth 2 (two) times daily. 8/7/19   Adan Eaton MD   docusate sodium (COLACE) 100 MG capsule Take 1 capsule (100 mg total) by mouth every 12 (twelve) hours. 7/16/19   Daina Lynch NP   gabapentin (NEURONTIN) 300 MG capsule Take 1 capsule (300 mg total) by mouth once daily. 3/20/19   Lucina Carter MD   levothyroxine (SYNTHROID) 50 MCG tablet Take 1 tablet (50 mcg total) by mouth once daily. 3/20/19   Lucina Carter MD   meclizine (ANTIVERT) 25 mg tablet Take 25 mg by mouth every evening.     Historical Provider, MD   melatonin 5 mg Tab Take 5 mg by mouth every evening.    Historical Provider, MD   metoprolol tartrate (LOPRESSOR) 100 MG tablet TAKE 1 TABLET BY MOUTH TWICE DAILY  Patient taking differently: TAKE 1 TABLET (100mg) BY MOUTH TWICE DAILY 5/20/19   Lucina Carter MD   MV-MIN/FA/D3/OM-3/DHA/EPA/FISH (CARDIAMIN ORAL) Take 1 tablet by mouth once daily.      Historical Provider, MD   simvastatin (ZOCOR) 40 MG tablet Take 1 tablet (40 mg total) by mouth nightly. 3/20/19   Lucina Carter MD   torsemide (DEMADEX) 5 MG Tab Take 2 tablets (10 mg total) by mouth once daily. 3/20/19   Lucina Carter MD       Social History     Socioeconomic History    Marital status:      Spouse name: Not on file    Number of children: Not on file    Years of education: Not on file    Highest education level: Not on file   Occupational History    Not on file   Social Needs    Financial resource strain: Not on file    Food insecurity:     Worry: Not on file     Inability: Not on file    Transportation needs:     Medical: Not on file     Non-medical: Not on file   Tobacco Use    Smoking status: Never Smoker    Smokeless tobacco: Never Used   Substance and Sexual Activity    Alcohol use: No    Drug use: No    Sexual activity: Not on file   Lifestyle     Physical activity:     Days per week: Not on file     Minutes per session: Not on file    Stress: Not on file   Relationships    Social connections:     Talks on phone: Not on file     Gets together: Not on file     Attends Gnosticism service: Not on file     Active member of club or organization: Not on file     Attends meetings of clubs or organizations: Not on file     Relationship status: Not on file   Other Topics Concern    Not on file   Social History Narrative    Not on file       Family History   Problem Relation Age of Onset    Hypertension Mother     Cancer Mother     Hypertension Father     Heart disease Sister     Breast cancer Sister     Cancer Brother     Diabetes Brother     Breast cancer Sister        Review of patient's allergies indicates:   Allergen Reactions    Penicillins Hives    Allergies have been reviewed.     ROS: ROS    PE:   Vitals:    04/24/20 1255 04/24/20 1314 04/24/20 1341 04/24/20 1344   BP:   97/61    BP Location:       Patient Position:       Pulse: 61 (!) 58 (!) 56 (!) 55   Resp:       Temp:       TempSrc:       SpO2: (!) 92% (!) 94% (!) 87% (!) 92%   Weight:        Physical Exam    Alert and orientated X 3   HEENT: Head: Normocephalic no trauma                Ears : Normal Pinna No Drainage no Battles sign                Eyes: Vision Unchanged, No conjunctivitis,No drainage                Neck: Supple, No JVD,No Abnormal Carotid Pulsations                Throat: No Erythema, No pus,No Swelling,Mallampati score= 3    Chest: course BS bilaterally with crackles no wheezes   Cardiac: RRR S1+ S2 with a -S3: +M = 2/6, No R/H/G  Abdomen: Bowel Sounds are Normal.Soft Abdomen. No organomegaly of Liver,Spleen,or Kidneys   CNS: Non focal and intact. Cranial nerves 2, 346,8,9,10 and 12 are normal.Norrmal gait.Normal posture.  Extremities: No Clubbing,No Cyanosis with oxygen,Positive mild edema of lower extremities Bilateral  Skin: No Rash, No Ulcerative sores,and No cellulitis  of the IV site.    Lab Results   Component Value Date    WBC 10.02 04/24/2020    HGB 11.6 (L) 04/24/2020    HCT 36.0 (L) 04/24/2020     04/24/2020    CHOL 142 03/13/2019    TRIG 107 03/13/2019    HDL 47 03/13/2019    ALT 20 04/24/2020    AST 37 04/24/2020     04/24/2020    K 3.2 (L) 04/24/2020    CL 97 04/24/2020    CREATININE 1.6 (H) 04/24/2020    BUN 36 (H) 04/24/2020    CO2 29 04/24/2020    TSH 4.140 (H) 03/13/2019    INR 1.1 04/24/2020               Assessment/Plan:     Acute hypoxemic respiratory failure  On venti mask 50% sat is 90%     Pneumonia due to COVID-19 virus  ++ covid       Abdominal pain  All over ? Secondary to covid     Chronic insomnia  Multifactorial cause       Rehydration and bipap agree with hospital     Thank you for your consult. I will follow-up with patient. Please contact us if you have any additional questions.     Lai Wilson MD  Pulmonology  [facility]

## 2020-04-24 NOTE — PLAN OF CARE
Patient admitted today with PNA due to COVID-19. ER MD request hospice consult for inpatient hospice at [place]. I spoke with patient's daughter who states verbalizes and agrees with this plan of care. [facility] already notified and candelaria Estrada nurse with hospice company is at hospital to assess for inpatient hospice. If Inpatient hospice is not approved, alternative discharge plan will have to be set in place.        04/24/20 0297   Discharge Assessment   Assessment Type Discharge Planning Assessment   Confirmed/corrected address and phone number on facesheet? Yes   Assessment information obtained from? Caregiver   Expected Length of Stay (days) 2   Communicated expected length of stay with patient/caregiver yes   Prior to hospitilization cognitive status: Alert/Oriented   Prior to hospitalization functional status: Needs Assistance   Current cognitive status: Unable to Assess  (Unable to speak with patient directly due to COVID +)   Current Functional Status: Needs Assistance   Facility Arrived From: Home   Lives With child(anirudh), adult   Able to Return to Prior Arrangements other (see comments)  (TBD)   Is patient able to care for self after discharge? No   Who are your caregiver(s) and their phone number(s)? Quirino Patiño- [phone]   Patient's perception of discharge disposition hospice/home   Readmission Within the Last 30 Days no previous admission in last 30 days   Patient currently receives any other outside agency services? No   Equipment Currently Used at Home wheelchair;walker, rolling   Part D Coverage [facility]    Do you have any problems affording any of your prescribed medications? No   Is the patient taking medications as prescribed? yes   Does the patient have transportation home? Yes   Transportation Anticipated other (see comments)  (IF discharged will go by way of [facility], if still bed confined)   Does the patient receive services at the [facility]? No   Discharge Plan A Inpatient  Hospice   Discharge Plan B Hospice/home   DME Needed Upon Discharge  none   Patient/Family in Agreement with Plan yes

## 2020-04-24 NOTE — TELEPHONE ENCOUNTER
Pt no respiratory distress. Mild SOB with ambulating. RN reached out to house supervisor at [facility]. Informed her looking to speak with on call physician for Dr Carter. House Supervisor indicated the on call MD is ER Chief of [facility], Dr. Cifuentes. House supervisor spoke with Dr Cifuentes and provided pt's hx and symptoms. Dr Cifuentes recommended pt present to ED. [facility] advised ER is recommended per Dr Cifuentes. Edie will bring her grandmother to the ED.      Reason for Disposition   MILD difficulty breathing (e.g., minimal/no SOB at rest, SOB with walking, pulse <100)    Additional Information   Negative: SEVERE difficulty breathing (e.g., struggling for each breath, speaks in single words)   Negative: Difficult to awaken or acting confused (e.g., disoriented, slurred speech)   Negative: Bluish (or gray) lips or face now   Negative: Shock suspected (e.g., cold/pale/clammy skin, too weak to stand, low BP, rapid pulse)   Negative: Sounds like a life-threatening emergency to the triager   Negative: SEVERE or constant chest pain (Exception: mild central chest pain, present only when coughing)   Negative: MODERATE difficulty breathing (e.g., speaks in phrases, SOB even at rest, pulse 100-120)   Negative: Patient sounds very sick or weak to the triager    Protocols used: CORONAVIRUS (COVID-19) DIAGNOSED OR [id number]

## 2020-04-24 NOTE — PLAN OF CARE
04/24/20 1723   Post-Acute Status   Post-Acute Authorization Hospice   Hospice Status Authorization Obtained       Notified by Lamar with [facility] that patient does qualify for Inpatient hospice and can be admitted as such today. Notified fidel Parkinson supervisor of this so a pending admit could be made. Cat in business office made aware as well. Natalie with [facility] has written the admit for patient's nurse to use. This will be a discharge/readmit.

## 2020-04-24 NOTE — SUBJECTIVE & OBJECTIVE
Past Medical History:   Diagnosis Date    Anxiety     Breast cancer     Cancer     breast    Hyperlipidemia     Hypertension     Osteoporosis     PVD (peripheral vascular disease)        Past Surgical History:   Procedure Laterality Date    CHOLECYSTECTOMY      EYE SURGERY      HEMIARTHROPLASTY OF HIP Right 7/12/2019    Procedure: HEMIARTHROPLASTY, HIP;  Surgeon: NATALEE Bhatia II, MD;  Location: [facility];  Service: Orthopedics;  Laterality: Right;    HYSTERECTOMY      MASTECTOMY Right        Review of patient's allergies indicates:   Allergen Reactions    Penicillins Hives       No current facility-administered medications on file prior to encounter.      Current Outpatient Medications on File Prior to Encounter   Medication Sig    acetaminophen (TYLENOL) 650 MG TbSR Take 1 tablet (650 mg total) by mouth every 8 (eight) hours.    ALPRAZolam (XANAX) 0.5 MG tablet Take 1 tablet (0.5 mg total) by mouth 2 (two) times daily as needed. 30 day supply only. Appointment needed. Thanks    amino ac/protein hydr/whey pro (LIQUACEL ORAL) Take 30 mLs by mouth 2 (two) times daily.    ascorbic acid, vitamin C, (VITAMIN C) 500 MG tablet Take 500 mg by mouth 2 (two) times daily.    calcium-vitamin D3 (OS- + D3) 500 mg(1,250mg) -200 unit per tablet Take 1 tablet by mouth 2 (two) times daily with meals.    ciprofloxacin HCl (CIPRO) 500 MG tablet Take 1 tablet (500 mg total) by mouth 2 (two) times daily.    docusate sodium (COLACE) 100 MG capsule Take 1 capsule (100 mg total) by mouth every 12 (twelve) hours.    gabapentin (NEURONTIN) 300 MG capsule Take 1 capsule (300 mg total) by mouth once daily.    levothyroxine (SYNTHROID) 50 MCG tablet Take 1 tablet (50 mcg total) by mouth once daily.    meclizine (ANTIVERT) 25 mg tablet Take 25 mg by mouth every evening.     melatonin 5 mg Tab Take 5 mg by mouth every evening.    metoprolol tartrate (LOPRESSOR) 100 MG tablet TAKE 1 TABLET BY MOUTH TWICE DAILY  (Patient taking differently: TAKE 1 TABLET (100mg) BY MOUTH TWICE DAILY)    MV-MIN/FA/D3/OM-3/DHA/EPA/FISH (CARDIAMIN ORAL) Take 1 tablet by mouth once daily.      simvastatin (ZOCOR) 40 MG tablet Take 1 tablet (40 mg total) by mouth nightly.    torsemide (DEMADEX) 5 MG Tab Take 2 tablets (10 mg total) by mouth once daily.     Family History     Problem Relation (Age of Onset)    Breast cancer Sister, Sister    Cancer Mother, Brother    Diabetes Brother    Heart disease Sister    Hypertension Mother, Father        Tobacco Use    Smoking status: Never Smoker    Smokeless tobacco: Never Used   Substance and Sexual Activity    Alcohol use: No    Drug use: No    Sexual activity: Not on file     Review of Systems   Constitutional: Positive for fever. Negative for chills.   HENT: Positive for congestion and sore throat. Negative for ear pain, postnasal drip, rhinorrhea and trouble swallowing.    Respiratory: Positive for shortness of breath. Negative for cough and wheezing.    Cardiovascular: Negative for chest pain and palpitations.   Gastrointestinal: Positive for nausea. Negative for abdominal pain, diarrhea and vomiting.   Genitourinary: Positive for difficulty urinating. Negative for dysuria and frequency.   Skin: Negative for rash.   Neurological: Positive for weakness. Negative for headaches.   Psychiatric/Behavioral: The patient is nervous/anxious.      Objective:     Vital Signs (Most Recent):  Temp: 96 °F (35.6 °C) (04/24/20 1620)  Pulse: (!) 119 (04/24/20 1800)  Resp: 20 (04/24/20 1620)  BP: (!) 93/48 (04/24/20 1620)  SpO2: (!) 94 % (04/24/20 1620) Vital Signs (24h Range):  Temp:  [96 °F (35.6 °C)-97.4 °F (36.3 °C)] 96 °F (35.6 °C)  Pulse:  [] 119  Resp:  [20-24] 20  SpO2:  [84 %-94 %] 94 %  BP: ()/(42-61) 93/48     Weight: 60.2 kg (132 lb 11.2 oz)  Body mass index is 25.07 kg/m².    Physical Exam   Constitutional: She is oriented to person, place, and time. She appears well-developed. No  distress.   Thin frail lady   HENT:   Head: Normocephalic and atraumatic.   Eyes: Pupils are equal, round, and reactive to light. Conjunctivae and EOM are normal.   Neck: Normal range of motion. Neck supple. No thyromegaly present.   Cardiovascular: Regular rhythm, normal heart sounds and intact distal pulses.   gloria   Pulmonary/Chest: Effort normal. No respiratory distress. She has no wheezes. She has rales.   bipap in place   Abdominal: Soft. Bowel sounds are normal. There is no tenderness.   Musculoskeletal: Normal range of motion. She exhibits no edema.   Lymphadenopathy:     She has no cervical adenopathy.   Neurological: She is alert and oriented to person, place, and time.   Skin: Skin is warm and dry. No rash noted.   Psychiatric: She has a normal mood and affect. Her behavior is normal.   Nursing note and vitals reviewed.        CRANIAL NERVES     CN III, IV, VI   Pupils are equal, round, and reactive to light.  Extraocular motions are normal.        Significant Labs:   Blood Culture: No results for input(s): LABBLOO in the last 48 hours.  CBC:   Recent Labs   Lab 04/24/20  1304   WBC 10.02   HGB 11.6*   HCT 36.0*        CMP:   Recent Labs   Lab 04/24/20  1304      K 3.2*   CL 97   CO2 29   *   BUN 36*   CREATININE 1.6*   CALCIUM 8.1*   PROT 5.9*   ALBUMIN 1.9*   BILITOT 0.6   ALKPHOS 86   AST 37   ALT 20   ANIONGAP 11   EGFRNONAA 28*     Cardiac Markers:   Recent Labs   Lab 04/24/20  1304   *     Lactic Acid:   Recent Labs   Lab 04/24/20  1304   LACTATE 3.8*     Magnesium: No results for input(s): MG in the last 48 hours.  POCT Glucose: No results for input(s): POCTGLUCOSE in the last 48 hours.  Respiratory Culture: No results for input(s): GSRESP, RESPIRATORYC in the last 48 hours.  Urine Culture: No results for input(s): LABURIN in the last 48 hours.  Urine Studies:   Recent Labs   Lab 04/24/20  1517   COLORU Yellow   APPEARANCEUA Clear   PHUR 5.0   SPECGRAV <=1.005*    PROTEINUA 1+*   GLUCUA Negative   KETONESU Negative   BILIRUBINUA Negative   OCCULTUA Negative   NITRITE Negative   UROBILINOGEN Negative   LEUKOCYTESUR Negative   RBCUA 5*   WBCUA 5   BACTERIA Few*   HYALINECASTS 0       Significant Imaging: I have reviewed all pertinent imaging results/findings within the past 24 hours.   CXR  Chronic lung changes are noted.  There is superimposed hazy opacity in the right upper lung and left lung base concerning for an inflammatory/infectious process.

## 2020-04-24 NOTE — TELEPHONE ENCOUNTER
louis Irizarry on the line. Positive COVID as of Tuesday. Temp never >101. C/O SOB, feels like she can't take deep breath. Appetite still good. Still drinking. Still ambulating with walker. Granddaughter does not feel she is in respiratory distress but just wants to be sure what she should do. CXR clear Monday. Fever responds to Tylenol.

## 2020-04-24 NOTE — PLAN OF CARE
Case management called for hospice set up. Dr. Cifuentes would like inpatient hospice if possible. I spoke with daughter, Oxana, who states she understands plan. I did educate her that it is up to the hospice facility of the patient is inpatient hospice appropriate or not. Once Dr. Cifuentes writes note, all documentation will be faxed to [facility].     Called and left message with Lamar at [facility]. Awaiting call back.       Enedina Han, RN, BSN  [facility]   Case Management/Utilization Review  [phone] (Phone)  [phone] (Fax)

## 2020-04-25 NOTE — RESPIRATORY THERAPY
Dr Montelongo in the room and asked to turn off BIPAP that the mask was off of pt. . Pt was screaming and pulled off mask.

## 2020-04-25 NOTE — ASSESSMENT & PLAN NOTE
Support o2.  Place on NRB but bipap pending sats.    Patient and family desire comfort care.    Morphine/ativan prn o2 hunger.

## 2020-04-25 NOTE — NURSING
Patient's daughter, grand-daughter, and niece at bedside.  Father Lai Bullock also at bedside for patient's last rights.

## 2020-04-25 NOTE — PROGRESS NOTES
[facility]  Pulmonology  Progress Note    Patient Name: Chanda Kaufman  MRN: [id number]  Admission Date: 4/24/2020  Hospital Length of Stay: 1 days  Code Status: DNR  Attending Provider: Kathryn Montelongo MD  Primary Care Provider: Lucina Carter MD   Principal Problem: <principal problem not specified>    Subjective:     Interval History: less coversive not using bipap ++ covid     Objective:     Vital Signs (Most Recent):  Temp: 97.3 °F (36.3 °C) (04/25/20 0835)  Pulse: 75 (04/25/20 1000)  Resp: (!) 22 (04/25/20 0835)  BP: (!) 116/56 (04/25/20 0835)  SpO2: (!) 84 % (04/25/20 0835) Vital Signs (24h Range):  Temp:  [96 °F (35.6 °C)-98.5 °F (36.9 °C)] 97.3 °F (36.3 °C)  Pulse:  [] 75  Resp:  [18-24] 22  SpO2:  [84 %-94 %] 84 %  BP: ()/(42-74) 116/56     Weight: 60.2 kg (132 lb 11.1 oz)  Body mass index is 25.07 kg/m².    No intake or output data in the 24 hours ending 04/25/20 1205    Physical Exam   Constitutional: She is oriented to person, place, and time. She appears well-developed and well-nourished. She is cooperative.  Non-toxic appearance. She does not appear ill. No distress.   HENT:   Head: Normocephalic and atraumatic.   Right Ear: Hearing, tympanic membrane, external ear and ear canal normal.   Left Ear: Hearing, tympanic membrane, external ear and ear canal normal.   Nose: Nose normal. No mucosal edema, rhinorrhea or nasal deformity. No epistaxis. Right sinus exhibits no maxillary sinus tenderness and no frontal sinus tenderness. Left sinus exhibits no maxillary sinus tenderness and no frontal sinus tenderness.   Mouth/Throat: Uvula is midline, oropharynx is clear and moist and mucous membranes are normal. No trismus in the jaw. Normal dentition. No uvula swelling. No posterior oropharyngeal erythema.   Eyes: Conjunctivae and lids are normal. No scleral icterus.   Sclera clear bilat   Neck: Trachea normal, full passive range of motion without pain and phonation normal.  Neck supple.   Cardiovascular: Normal rate, regular rhythm, S1 normal, S2 normal, normal heart sounds, intact distal pulses and normal pulses.   No murmur heard.  Pulmonary/Chest: She is in respiratory distress (mild to moderate). She has decreased breath sounds in the right lower field and the left lower field. She has no wheezes. She has rhonchi in the right lower field and the left lower field. She has rales in the right lower field.   Abdominal: Soft. Normal appearance and bowel sounds are normal. She exhibits no distension. There is no tenderness.   Musculoskeletal: Normal range of motion. She exhibits no edema or deformity.   Neurological: She is alert and oriented to person, place, and time. She exhibits normal muscle tone. Coordination normal.   Skin: Skin is warm, dry and intact. Capillary refill takes less than 2 seconds. She is not diaphoretic. No pallor.   Psychiatric: She has a normal mood and affect. Her speech is normal and behavior is normal. Judgment and thought content normal. Cognition and memory are normal.   Nursing note and vitals reviewed.      Vents:  Oxygen Concentration (%): 100 (04/25/20 0835)    Lines/Drains/Airways     Peripheral Intravenous Line                 Peripheral IV - Single Lumen 04/25/20 1052 22 G Left Forearm less than 1 day                Significant Labs:    CBC/Anemia Profile:  Recent Labs   Lab 04/24/20  1304   WBC 10.02   HGB 11.6*   HCT 36.0*      MCV 96   RDW 13.5   FERRITIN 501*        Chemistries:  Recent Labs   Lab 04/24/20  1304      K 3.2*   CL 97   CO2 29   BUN 36*   CREATININE 1.6*   CALCIUM 8.1*   ALBUMIN 1.9*   PROT 5.9*   BILITOT 0.6   ALKPHOS 86   ALT 20   AST 37       All pertinent labs within the past 24 hours have been reviewed.    Significant Imaging:  I have reviewed all pertinent imaging results/findings within the past 24 hours.    Assessment/Plan:     Respiratory failure  Probably worsening cxr cause less awake     Pneumonia due to  COVID-19 virus  cxr          comfort care  Lai Wilson MD  Pulmonology  [facility]

## 2020-04-25 NOTE — DISCHARGE SUMMARY
[facility] Hospital Medicine  Discharge Summary      Patient Name: Chanda Kaufman  MRN: [id number]  Admission Date: 4/24/2020  Hospital Length of Stay: 0 days  Discharge Date and Time:  04/24/2020 7:07 PM  Attending Physician: Kathryn Streeter MD   Discharging Provider: Kathryn Streeter MD  Primary Care Provider: Lucina Carter MD      HPI:   89 year old female was brought to the er by her grand-daughter because of worsening shortness of breath and responsiveness after being brought home from the [facility] with diagnosis of covid pneumonia. Family desires hospice/comfort care and not aggressive care for her pna.    * No surgery found *      Hospital Course:   Patient admitted and almost immediately accepted to hospice.  O2 sats on bipap/NRB okay.  Patient with resp distress without o2 support.     Consults:   Consults (From admission, onward)        Status Ordering Provider     Inpatient consult to Pulmonology  Once     Provider:  Lai Wilson MD    Acknowledged KATHRYN STREETER     Inpatient consult to Social Work  Once     Provider:  (Not yet assigned)    JEAN PIERRE Cooley          * Acute hypoxemic respiratory failure  Support o2.  Place on NRB but bipap pending sats.    Patient and family desire comfort care.    Morphine/ativan prn o2 hunger.      Pneumonia due to COVID-19 virus  Will not use hydroxychloroquine.  Give vit c and zinc.  Treat for superimposed pneumonia with levaquin as she has signs of sepsis with elevated lactate and +procalcitonin.  Support O2.    Consult hospice.      Abdominal pain  Likely covid related.  KUB if no BM.  CMP okay.      Hypothyroidism due to acquired atrophy of thyroid  Cont synthroid.      Essential hypertension  Holding metoprolol.      Chronic insomnia  Melatonin.        Final Active Diagnoses:    Diagnosis Date Noted POA    PRINCIPAL PROBLEM:  Acute hypoxemic respiratory failure [J96.01] 04/24/2020 Yes    Pneumonia due to COVID-19 virus  [U07.1, J12.89] 04/24/2020 Yes    Respiratory failure [J96.90] 04/24/2020 Yes    Abdominal pain [R10.9] 08/04/2019 Yes    Essential hypertension [I10] 08/04/2015 Yes    Hypothyroidism due to acquired atrophy of thyroid [E03.4] 08/04/2015 Yes    Chronic insomnia [F51.04] 01/15/2013 Yes      Problems Resolved During this Admission:       Discharged Condition: critical    Disposition: Hospice/Medical Facility    Follow Up:    Patient Instructions:   No discharge procedures on file.    Significant Diagnostic Studies:     Pending Diagnostic Studies:     Procedure Component Value Units Date/Time    C-Reactive Protein [[id number]] Collected:  04/24/20 1304    Order Status:  Sent Lab Status:  In process Updated:  04/24/20 1304    Specimen:  Blood     Ferritin [[id number]] Collected:  04/24/20 1304    Order Status:  Sent Lab Status:  In process Updated:  04/24/20 1304    Specimen:  Blood          Medications:  Transfer Medications (for Discharge Readmit only):   Current Facility-Administered Medications   Medication Dose Route Frequency Provider Last Rate Last Dose    0.9%  NaCl infusion   Intravenous Continuous Kathryn Montelongo MD        ALPRAZolam tablet 0.5 mg  0.5 mg Oral BID PRN Kathryn Montelongo MD        ascorbic acid (vitamin C) tablet 500 mg  500 mg Oral BID Kathryn Montelongo MD        [START ON 4/25/2020] gabapentin capsule 300 mg  300 mg Oral Daily Kathryn Montelongo MD        lactated ringers bolus 500 mL  500 mL Intravenous Once Kathryn Montelongo MD        [START ON 4/26/2020] levoFLOXacin 500 mg/100 mL IVPB 500 mg  500 mg Intravenous Q48H Kathryn Montelongo MD        [START ON 4/25/2020] levothyroxine tablet 50 mcg  50 mcg Oral Before breakfast Kathryn Montelongo MD        lorazepam injection 1 mg  1 mg Intravenous ED 1 Time Kathryn Montelongo MD        lorazepam injection 2 mg  2 mg Intravenous Q2H PRN Kathryn Montelongo MD        melatonin tablet 6 mg  6 mg Oral QHS Kathryn Montelongo MD         [START ON 4/25/2020] montelukast tablet 10 mg  10 mg Oral Daily Kathryn Montelongo MD        morphine injection 2 mg  2 mg Intravenous Q2H PRN Kathryn Montelongo MD        ondansetron injection 4 mg  4 mg Intravenous Q8H PRN Kathryn Montelongo MD        [START ON 4/25/2020] pantoprazole EC tablet 40 mg  40 mg Oral Daily Kathryn Montelongo MD        promethazine (PHENERGAN) 12.5 mg in dextrose 5 % 50 mL IVPB  12.5 mg Intravenous Q6H PRN Kathryn Montelongo MD        simvastatin tablet 40 mg  40 mg Oral Nightly Kathryn Montelongo MD        sodium chloride 0.9% flush 10 mL  10 mL Intravenous PRN Kathryn Montelongo MD           Indwelling Lines/Drains at time of discharge:   Lines/Drains/Airways     None                 Time spent on the discharge of patient: 35 minutes  Patient was seen and examined on the date of discharge and determined to be suitable for discharge.         Kathryn Montelongo MD  Department of Hospital Medicine  [facility]

## 2020-04-25 NOTE — SUBJECTIVE & OBJECTIVE
Interval History: less coversive not using bipap ++ covid     Objective:     Vital Signs (Most Recent):  Temp: 97.3 °F (36.3 °C) (04/25/20 0835)  Pulse: 75 (04/25/20 1000)  Resp: (!) 22 (04/25/20 0835)  BP: (!) 116/56 (04/25/20 0835)  SpO2: (!) 84 % (04/25/20 0835) Vital Signs (24h Range):  Temp:  [96 °F (35.6 °C)-98.5 °F (36.9 °C)] 97.3 °F (36.3 °C)  Pulse:  [] 75  Resp:  [18-24] 22  SpO2:  [84 %-94 %] 84 %  BP: ()/(42-74) 116/56     Weight: 60.2 kg (132 lb 11.1 oz)  Body mass index is 25.07 kg/m².    No intake or output data in the 24 hours ending 04/25/20 1205    Physical Exam   Constitutional: She is oriented to person, place, and time. She appears well-developed and well-nourished. She is cooperative.  Non-toxic appearance. She does not appear ill. No distress.   HENT:   Head: Normocephalic and atraumatic.   Right Ear: Hearing, tympanic membrane, external ear and ear canal normal.   Left Ear: Hearing, tympanic membrane, external ear and ear canal normal.   Nose: Nose normal. No mucosal edema, rhinorrhea or nasal deformity. No epistaxis. Right sinus exhibits no maxillary sinus tenderness and no frontal sinus tenderness. Left sinus exhibits no maxillary sinus tenderness and no frontal sinus tenderness.   Mouth/Throat: Uvula is midline, oropharynx is clear and moist and mucous membranes are normal. No trismus in the jaw. Normal dentition. No uvula swelling. No posterior oropharyngeal erythema.   Eyes: Conjunctivae and lids are normal. No scleral icterus.   Sclera clear bilat   Neck: Trachea normal, full passive range of motion without pain and phonation normal. Neck supple.   Cardiovascular: Normal rate, regular rhythm, S1 normal, S2 normal, normal heart sounds, intact distal pulses and normal pulses.   No murmur heard.  Pulmonary/Chest: She is in respiratory distress (mild to moderate). She has decreased breath sounds in the right lower field and the left lower field. She has no wheezes. She has  rhonchi in the right lower field and the left lower field. She has rales in the right lower field.   Abdominal: Soft. Normal appearance and bowel sounds are normal. She exhibits no distension. There is no tenderness.   Musculoskeletal: Normal range of motion. She exhibits no edema or deformity.   Neurological: She is alert and oriented to person, place, and time. She exhibits normal muscle tone. Coordination normal.   Skin: Skin is warm, dry and intact. Capillary refill takes less than 2 seconds. She is not diaphoretic. No pallor.   Psychiatric: She has a normal mood and affect. Her speech is normal and behavior is normal. Judgment and thought content normal. Cognition and memory are normal.   Nursing note and vitals reviewed.      Vents:  Oxygen Concentration (%): 100 (04/25/20 0835)    Lines/Drains/Airways     Peripheral Intravenous Line                 Peripheral IV - Single Lumen 04/25/20 1052 22 G Left Forearm less than 1 day                Significant Labs:    CBC/Anemia Profile:  Recent Labs   Lab 04/24/20  1304   WBC 10.02   HGB 11.6*   HCT 36.0*      MCV 96   RDW 13.5   FERRITIN 501*        Chemistries:  Recent Labs   Lab 04/24/20  1304      K 3.2*   CL 97   CO2 29   BUN 36*   CREATININE 1.6*   CALCIUM 8.1*   ALBUMIN 1.9*   PROT 5.9*   BILITOT 0.6   ALKPHOS 86   ALT 20   AST 37       All pertinent labs within the past 24 hours have been reviewed.    Significant Imaging:  I have reviewed all pertinent imaging results/findings within the past 24 hours.

## 2020-04-25 NOTE — PLAN OF CARE
04/24/20 2013   Final Note   Assessment Type Final Discharge Note   Anticipated Discharge Disposition [facility]   What phone number can be called within the next 1-3 days to see how you are doing after discharge? [phone]   Hospital Follow Up  Appt(s) scheduled? No   Discharge plans and expectations educations in teach back method with documentation complete? Yes   Right Care Referral Info   Post Acute Recommendation Other  (Hospice)   Referral Type Hospice   Facility Name [facility]   Post-Acute Status   Post-Acute Authorization Hospice   Hospice Status Set-up Complete   Part D Coverage [facility]   Discharge Delays None known at this time       Patient discharged from acute care and admitted to inpatient hospice care with Aimee Gay.

## 2020-04-25 NOTE — ASSESSMENT & PLAN NOTE
Will not use hydroxychloroquine.  Give vit c and zinc.  Treat for superimposed pneumonia with levaquin as she has signs of sepsis with elevated lactate and +procalcitonin.  Support O2.    Consult hospice.

## 2020-04-25 NOTE — H&P
[facility] Medicine  History & Physical    Patient Name: Chanda Kaufman  MRN: [id number]  Admission Date: 4/24/2020  Attending Physician: Kathryn Montelongo MD   Primary Care Provider: Lucina Carter MD         Patient information was obtained from patient and ER records.     Subjective:     Principal Problem:Acute hypoxemic respiratory failure    Chief Complaint:   Chief Complaint   Patient presents with    Shortness of Breath    COVID-19 Concerns     positive Covid 3 days ago        HPI: 89 year old female was brought to the er by her grand-daughter because of worsening shortness of breath and responsiveness after being brought home from the [facility] with diagnosis of covid pneumonia. Family desires hospice/comfort care and not aggressive care for her pna.    Past Medical History:   Diagnosis Date    Anxiety     Breast cancer     Cancer     breast    Hyperlipidemia     Hypertension     Osteoporosis     PVD (peripheral vascular disease)        Past Surgical History:   Procedure Laterality Date    CHOLECYSTECTOMY      EYE SURGERY      HEMIARTHROPLASTY OF HIP Right 7/12/2019    Procedure: HEMIARTHROPLASTY, HIP;  Surgeon: NATALEE Bhatia II, MD;  Location: [facility] OR;  Service: Orthopedics;  Laterality: Right;    HYSTERECTOMY      MASTECTOMY Right        Review of patient's allergies indicates:   Allergen Reactions    Penicillins Hives       No current facility-administered medications on file prior to encounter.      Current Outpatient Medications on File Prior to Encounter   Medication Sig    acetaminophen (TYLENOL) 650 MG TbSR Take 1 tablet (650 mg total) by mouth every 8 (eight) hours.    ALPRAZolam (XANAX) 0.5 MG tablet Take 1 tablet (0.5 mg total) by mouth 2 (two) times daily as needed. 30 day supply only. Appointment needed. Thanks    amino ac/protein hydr/whey pro (LIQUACEL ORAL) Take 30 mLs by mouth 2 (two) times daily.    ascorbic acid, vitamin C, (VITAMIN  C) 500 MG tablet Take 500 mg by mouth 2 (two) times daily.    calcium-vitamin D3 (OS- + D3) 500 mg(1,250mg) -200 unit per tablet Take 1 tablet by mouth 2 (two) times daily with meals.    ciprofloxacin HCl (CIPRO) 500 MG tablet Take 1 tablet (500 mg total) by mouth 2 (two) times daily.    docusate sodium (COLACE) 100 MG capsule Take 1 capsule (100 mg total) by mouth every 12 (twelve) hours.    gabapentin (NEURONTIN) 300 MG capsule Take 1 capsule (300 mg total) by mouth once daily.    levothyroxine (SYNTHROID) 50 MCG tablet Take 1 tablet (50 mcg total) by mouth once daily.    meclizine (ANTIVERT) 25 mg tablet Take 25 mg by mouth every evening.     melatonin 5 mg Tab Take 5 mg by mouth every evening.    metoprolol tartrate (LOPRESSOR) 100 MG tablet TAKE 1 TABLET BY MOUTH TWICE DAILY (Patient taking differently: TAKE 1 TABLET (100mg) BY MOUTH TWICE DAILY)    MV-MIN/FA/D3/OM-3/DHA/EPA/FISH (CARDIAMIN ORAL) Take 1 tablet by mouth once daily.      simvastatin (ZOCOR) 40 MG tablet Take 1 tablet (40 mg total) by mouth nightly.    torsemide (DEMADEX) 5 MG Tab Take 2 tablets (10 mg total) by mouth once daily.     Family History     Problem Relation (Age of Onset)    Breast cancer Sister, Sister    Cancer Mother, Brother    Diabetes Brother    Heart disease Sister    Hypertension Mother, Father        Tobacco Use    Smoking status: Never Smoker    Smokeless tobacco: Never Used   Substance and Sexual Activity    Alcohol use: No    Drug use: No    Sexual activity: Not on file     Review of Systems   Constitutional: Positive for fever. Negative for chills.   HENT: Positive for congestion and sore throat. Negative for ear pain, postnasal drip, rhinorrhea and trouble swallowing.    Respiratory: Positive for shortness of breath. Negative for cough and wheezing.    Cardiovascular: Negative for chest pain and palpitations.   Gastrointestinal: Positive for nausea. Negative for abdominal pain, diarrhea and  vomiting.   Genitourinary: Positive for difficulty urinating. Negative for dysuria and frequency.   Skin: Negative for rash.   Neurological: Positive for weakness. Negative for headaches.   Psychiatric/Behavioral: The patient is nervous/anxious.      Objective:     Vital Signs (Most Recent):  Temp: 96 °F (35.6 °C) (04/24/20 1620)  Pulse: (!) 119 (04/24/20 1800)  Resp: 20 (04/24/20 1620)  BP: (!) 93/48 (04/24/20 1620)  SpO2: (!) 94 % (04/24/20 1620) Vital Signs (24h Range):  Temp:  [96 °F (35.6 °C)-97.4 °F (36.3 °C)] 96 °F (35.6 °C)  Pulse:  [] 119  Resp:  [20-24] 20  SpO2:  [84 %-94 %] 94 %  BP: ()/(42-61) 93/48     Weight: 60.2 kg (132 lb 11.2 oz)  Body mass index is 25.07 kg/m².    Physical Exam   Constitutional: She is oriented to person, place, and time. She appears well-developed. No distress.   Thin frail lady   HENT:   Head: Normocephalic and atraumatic.   Eyes: Pupils are equal, round, and reactive to light. Conjunctivae and EOM are normal.   Neck: Normal range of motion. Neck supple. No thyromegaly present.   Cardiovascular: Regular rhythm, normal heart sounds and intact distal pulses.   gloria   Pulmonary/Chest: Effort normal. No respiratory distress. She has no wheezes. She has rales.   bipap in place   Abdominal: Soft. Bowel sounds are normal. There is no tenderness.   Musculoskeletal: Normal range of motion. She exhibits no edema.   Lymphadenopathy:     She has no cervical adenopathy.   Neurological: She is alert and oriented to person, place, and time.   Skin: Skin is warm and dry. No rash noted.   Psychiatric: She has a normal mood and affect. Her behavior is normal.   Nursing note and vitals reviewed.        CRANIAL NERVES     CN III, IV, VI   Pupils are equal, round, and reactive to light.  Extraocular motions are normal.        Significant Labs:   Blood Culture: No results for input(s): LABBLOO in the last 48 hours.  CBC:   Recent Labs   Lab 04/24/20  1304   WBC 10.02   HGB 11.6*   HCT  36.0*        CMP:   Recent Labs   Lab 04/24/20  1304      K 3.2*   CL 97   CO2 29   *   BUN 36*   CREATININE 1.6*   CALCIUM 8.1*   PROT 5.9*   ALBUMIN 1.9*   BILITOT 0.6   ALKPHOS 86   AST 37   ALT 20   ANIONGAP 11   EGFRNONAA 28*     Cardiac Markers:   Recent Labs   Lab 04/24/20  1304   *     Lactic Acid:   Recent Labs   Lab 04/24/20  1304   LACTATE 3.8*     Magnesium: No results for input(s): MG in the last 48 hours.  POCT Glucose: No results for input(s): POCTGLUCOSE in the last 48 hours.  Respiratory Culture: No results for input(s): GSRESP, RESPIRATORYC in the last 48 hours.  Urine Culture: No results for input(s): LABURIN in the last 48 hours.  Urine Studies:   Recent Labs   Lab 04/24/20  1517   COLORU Yellow   APPEARANCEUA Clear   PHUR 5.0   SPECGRAV <=1.005*   PROTEINUA 1+*   GLUCUA Negative   KETONESU Negative   BILIRUBINUA Negative   OCCULTUA Negative   NITRITE Negative   UROBILINOGEN Negative   LEUKOCYTESUR Negative   RBCUA 5*   WBCUA 5   BACTERIA Few*   HYALINECASTS 0       Significant Imaging: I have reviewed all pertinent imaging results/findings within the past 24 hours.   CXR  Chronic lung changes are noted.  There is superimposed hazy opacity in the right upper lung and left lung base concerning for an inflammatory/infectious process.    Assessment/Plan:     * Acute hypoxemic respiratory failure  Support o2.  Place on NRB but bipap pending sats.    Patient and family desire comfort care.    Morphine/ativan prn o2 hunger.      Pneumonia due to COVID-19 virus  Will not use hydroxychloroquine.  Give vit c and zinc.  Treat for superimposed pneumonia with levaquin as she has signs of sepsis with elevated lactate and +procalcitonin.  Support O2.    Consult hospice.      Abdominal pain  Likely covid related.  KUB if no BM.  CMP okay.      Hypothyroidism due to acquired atrophy of thyroid  Cont synthroid.      Essential hypertension  Holding metoprolol.      Chronic  insomnia  Melatonin.        VTE Risk Mitigation (From admission, onward)         Ordered     IP VTE HIGH RISK PATIENT  Once      04/24/20 1735     Place sequential compression device  Until discontinued      04/24/20 1735                   Kathryn Montelongo MD  Department of Hospital Medicine   [facility]

## 2020-04-25 NOTE — PLAN OF CARE
04/25/20 0856   Advance Directives (For Healthcare)   Advance Directive  (If Adv Dir status is received, view document under Adv Dir in header or Chart Review Media tab) Advance Directive currently in Epic.         HCPOA currently in Epic. LAPOST currently in chart. Confirmed DNR.

## 2020-04-25 NOTE — HOSPITAL COURSE
Patient admitted and almost immediately accepted to hospice.  O2 sats on bipap/NRB okay.  Patient with resp distress without o2 support.

## 2020-04-25 NOTE — PLAN OF CARE
Patient a recent resident of [facility].  Family removed her and brought her in with weakness and shortness of breath.  COVID positive.  Now on hospice care with [facility].  Patient arousable and very agitated, morphine and ativan given intermittently to help calm patient.  On non-rebreather, saturation ranging from 80-87%.  Afebrile, hemodynamically stable.  Avasys in place for patient safety.  Turn every 2 hours for skin comfort.  Heart monitor NSR.  PO medications held due to aspiration risk and inability to cooperate with swallowing.  Natalie, Hospice nurse did come to bedside to see patient. No change in orders.  Family able to come for visit.  Care plan discussed while here.

## 2020-04-25 NOTE — PLAN OF CARE
Patient admitted to inpatient hospice care under [facility]. Nurse with [facility] will check on patient daily in house, and could possibly transfer patient home where she would continue with hospice care. The decision to continue inpatient hospice is up to [facility]. Patient would have to continue to meet inpatient (ex: Bipap, uncontrolled pain.) CM will remain available to aid with discharge needs.        04/24/20 2017   Discharge Assessment   Assessment Type Discharge Planning Assessment   Confirmed/corrected address and phone number on facesheet? Yes   Assessment information obtained from? Caregiver   Expected Length of Stay (days) 2   Communicated expected length of stay with patient/caregiver yes   Prior to hospitilization cognitive status: Alert/Oriented   Prior to hospitalization functional status: Needs Assistance   Facility Arrived From: Home to ED   Able to Return to Prior Arrangements other (see comments)  (TBD)   Is patient able to care for self after discharge? No   Who are your caregiver(s) and their phone number(s)? Chantelle [phone]   Patient's perception of discharge disposition hospice/home   Readmission Within the Last 30 Days no previous admission in last 30 days   Patient currently being followed by outpatient case management? No   Patient currently receives any other outside agency services? No   Equipment Currently Used at Home walker, rolling;wheelchair   Part D Coverage [facility]   Do you have any problems affording any of your prescribed medications? No   Is the patient taking medications as prescribed? yes   Does the patient have transportation home? Yes   Transportation Anticipated agency   Does the patient receive services at the [facility]? No   Discharge Plan A Hospice/home   Discharge Plan B Hospice/home   DME Needed Upon Discharge  none   Patient/Family in Agreement with Plan yes

## 2020-04-26 NOTE — PLAN OF CARE
Problem: Adult Inpatient Plan of Care  Goal: Plan of Care Review  Outcome: Ongoing, Progressing  Goal: Patient-Specific Goal (Individualization)  Outcome: Ongoing, Progressing  Goal: Absence of Hospital-Acquired Illness or Injury  Outcome: Ongoing, Progressing  Goal: Optimal Comfort and Wellbeing  Outcome: Ongoing, Progressing  Goal: Readiness for Transition of Care  Outcome: Ongoing, Progressing  Goal: Rounds/Family Conference  Outcome: Ongoing, Progressing     Problem: Wound  Goal: Optimal Wound Healing  Outcome: Ongoing, Progressing     Problem: Fall Injury Risk  Goal: Absence of Fall and Fall-Related Injury  Outcome: Ongoing, Progressing     Problem: Skin Injury Risk Increased  Goal: Skin Health and Integrity  Outcome: Ongoing, Progressing     Patient admitted with hospice. Patient recieves PRN ativan and morphine. Patient arouses to voice. Mumbles incoherently. Unable to access orientation. Grimaces turning. Does not follow commands. Receives 15L on nonrebreather mask. NRS on telemetry. Telesitter at bedside.

## 2020-04-26 NOTE — NURSING
When patient was woken up to change brief, she became agitated and restless and inconsolable.  Morphine given to help patient relax.

## 2020-04-26 NOTE — PROGRESS NOTES
[facility]  Pulmonology  Progress Note    Patient Name: Chanda Kaufman  MRN: [id number]  Admission Date: 4/24/2020  Hospital Length of Stay: 2 days  Code Status: DNR  Attending Provider: Kathryn Montelongo MD  Primary Care Provider: Lucina Carter MD   Principal Problem: <principal problem not specified>    Subjective:     Interval History: prognosis poor     Objective:     Vital Signs (Most Recent):  Temp: 98.3 °F (36.8 °C) (04/26/20 0850)  Pulse: 83 (04/26/20 1000)  Resp: 20 (04/26/20 0850)  BP: (!) 117/57 (04/26/20 0850)  SpO2: (!) 84 % (04/26/20 0850) Vital Signs (24h Range):  Temp:  [96.1 °F (35.6 °C)-98.8 °F (37.1 °C)] 98.3 °F (36.8 °C)  Pulse:  [] 83  Resp:  [18-22] 20  SpO2:  [70 %-85 %] 84 %  BP: (111-133)/(56-79) 117/57     Weight: 60 kg (132 lb 4.4 oz)  Body mass index is 24.99 kg/m².      Intake/Output Summary (Last 24 hours) at 4/26/2020 1221  Last data filed at 4/26/2020 0855  Gross per 24 hour   Intake 0 ml   Output --   Net 0 ml       Physical Exam   Cardiovascular: Normal rate, regular rhythm, S1 normal, S2 normal and normal heart sounds.   No murmur heard.  Pulmonary/Chest: No accessory muscle usage. No respiratory distress. She has no decreased breath sounds. She has no wheezes. She has no rhonchi. She has no rales.   Skin: Capillary refill takes less than 2 seconds.       Vents:  Oxygen Concentration (%): 100 (04/25/20 2000)    Lines/Drains/Airways     Peripheral Intravenous Line                 Peripheral IV - Single Lumen 04/25/20 1052 22 G Left Forearm 1 day                Significant Labs:    CBC/Anemia Profile:  Recent Labs   Lab 04/24/20  1304   WBC 10.02   HGB 11.6*   HCT 36.0*      MCV 96   RDW 13.5   FERRITIN 501*        Chemistries:  Recent Labs   Lab 04/24/20  1304      K 3.2*   CL 97   CO2 29   BUN 36*   CREATININE 1.6*   CALCIUM 8.1*   ALBUMIN 1.9*   PROT 5.9*   BILITOT 0.6   ALKPHOS 86   ALT 20   AST 37       All pertinent labs within the past  24 hours have been reviewed.    Significant Imaging:  I have reviewed all pertinent imaging results/findings within the past 24 hours.    Assessment/Plan:     Respiratory failure  Probably worsening cxr cause less awake     Pneumonia due to COVID-19 virus  cxr            Lai Wilson MD  Pulmonology  [facility]

## 2020-04-26 NOTE — NURSING
Upon initial assessment, patient resting quietly.  With mild physical and voice stimulation, patient woke up and began moaning which turned to crying.  Inconsolable. Morphine given and reassurance offered until patient able to calm down.

## 2020-04-26 NOTE — PLAN OF CARE
Patient admitted here to [facility].  Positive for COVID.  Vitals changed to daily checks for patient comfort.  Family at bedside to visit in the morning.  Morphine and Ativan given PRN for agitation and restlessness.  Heart monitor in place, SR.  Oral care done with patient checks.  Patient on non-rebreather, oxygen saturation 84%.  Hemodynamically stable, afebrile.  Oral medications discontinued due to patient being too unresponsive and unable to swallow.  Avasys in room for fall prevention/patient safety.  Care plan discussed with family.

## 2020-04-26 NOTE — NURSING
Patient still audibly moaning and crying 40 minutes after morphine given.  Ativan given and patient consoled until she fell back asleep.

## 2020-04-26 NOTE — NURSING
Family at bedside visiting. Patient asleep and peaceful.  Family chose to not stimulate patient.

## 2020-04-26 NOTE — SUBJECTIVE & OBJECTIVE
Interval History: prognosis poor     Objective:     Vital Signs (Most Recent):  Temp: 98.3 °F (36.8 °C) (04/26/20 0850)  Pulse: 83 (04/26/20 1000)  Resp: 20 (04/26/20 0850)  BP: (!) 117/57 (04/26/20 0850)  SpO2: (!) 84 % (04/26/20 0850) Vital Signs (24h Range):  Temp:  [96.1 °F (35.6 °C)-98.8 °F (37.1 °C)] 98.3 °F (36.8 °C)  Pulse:  [] 83  Resp:  [18-22] 20  SpO2:  [70 %-85 %] 84 %  BP: (111-133)/(56-79) 117/57     Weight: 60 kg (132 lb 4.4 oz)  Body mass index is 24.99 kg/m².      Intake/Output Summary (Last 24 hours) at 4/26/2020 1221  Last data filed at 4/26/2020 0855  Gross per 24 hour   Intake 0 ml   Output --   Net 0 ml       Physical Exam   Cardiovascular: Normal rate, regular rhythm, S1 normal, S2 normal and normal heart sounds.   No murmur heard.  Pulmonary/Chest: No accessory muscle usage. No respiratory distress. She has no decreased breath sounds. She has no wheezes. She has no rhonchi. She has no rales.   Skin: Capillary refill takes less than 2 seconds.       Vents:  Oxygen Concentration (%): 100 (04/25/20 2000)    Lines/Drains/Airways     Peripheral Intravenous Line                 Peripheral IV - Single Lumen 04/25/20 1052 22 G Left Forearm 1 day                Significant Labs:    CBC/Anemia Profile:  Recent Labs   Lab 04/24/20  1304   WBC 10.02   HGB 11.6*   HCT 36.0*      MCV 96   RDW 13.5   FERRITIN 501*        Chemistries:  Recent Labs   Lab 04/24/20  1304      K 3.2*   CL 97   CO2 29   BUN 36*   CREATININE 1.6*   CALCIUM 8.1*   ALBUMIN 1.9*   PROT 5.9*   BILITOT 0.6   ALKPHOS 86   ALT 20   AST 37       All pertinent labs within the past 24 hours have been reviewed.    Significant Imaging:  I have reviewed all pertinent imaging results/findings within the past 24 hours.

## 2020-04-26 NOTE — PROGRESS NOTES
Staff Handoff  Bedside report received from CRISTINA Hernandez. Patient arouses to speech, moaning, restless. Telesitter at bedside. Will continue to monitor.       Resident Handoff

## 2020-04-27 NOTE — PROGRESS NOTES
[facility]  Pulmonology  Progress Note    Patient Name: Chanda Kaufman  MRN: [id number]  Admission Date: 4/24/2020  Hospital Length of Stay: 3 days  Code Status: DNR  Attending Provider: Kathryn Montelongo MD  Primary Care Provider: Lucina Carter MD   Principal Problem: <principal problem not specified>    Subjective:     Interval History: less responsive compared to er    Objective:     Vital Signs (Most Recent):  Temp: 99.3 °F (37.4 °C) (04/27/20 0748)  Pulse: 73 (04/27/20 1008)  Resp: (!) 22 (04/27/20 0748)  BP: (!) 140/90 (04/27/20 0748)  SpO2: (!) 91 % (04/27/20 0748) Vital Signs (24h Range):  Temp:  [98.8 °F (37.1 °C)-99.3 °F (37.4 °C)] 99.3 °F (37.4 °C)  Pulse:  [] 73  Resp:  [18-22] 22  SpO2:  [66 %-91 %] 91 %  BP: (140-147)/(63-90) 140/90     Weight: 60 kg (132 lb 4.4 oz)  Body mass index is 24.99 kg/m².      Intake/Output Summary (Last 24 hours) at 4/27/2020 1228  Last data filed at 4/26/2020 1800  Gross per 24 hour   Intake 0 ml   Output 1 ml   Net -1 ml       Physical Exam   Constitutional: She is oriented to person, place, and time. She appears well-developed and well-nourished. She is cooperative.  Non-toxic appearance. She does not appear ill. No distress.   HENT:   Head: Normocephalic and atraumatic.   Right Ear: Hearing, tympanic membrane, external ear and ear canal normal.   Left Ear: Hearing, tympanic membrane, external ear and ear canal normal.   Nose: Nose normal. No mucosal edema, rhinorrhea or nasal deformity. No epistaxis. Right sinus exhibits no maxillary sinus tenderness and no frontal sinus tenderness. Left sinus exhibits no maxillary sinus tenderness and no frontal sinus tenderness.   Mouth/Throat: Uvula is midline, oropharynx is clear and moist and mucous membranes are normal. No trismus in the jaw. Normal dentition. No uvula swelling. No posterior oropharyngeal erythema.   Eyes: Conjunctivae and lids are normal. No scleral icterus.   Sclera clear bilat   Neck:  Trachea normal, full passive range of motion without pain and phonation normal. Neck supple.   Cardiovascular: Normal rate, regular rhythm, normal heart sounds, intact distal pulses and normal pulses.   Pulmonary/Chest: She is in respiratory distress (mild to moderate). She has decreased breath sounds in the right middle field, the right lower field, the left middle field and the left lower field. She has no wheezes. She has rhonchi in the right middle field and the left middle field. She has rales in the right middle field and the left middle field.   Abdominal: Soft. Normal appearance and bowel sounds are normal. She exhibits no distension. There is no tenderness.   Musculoskeletal: Normal range of motion. She exhibits no edema or deformity.   Neurological: She is alert and oriented to person, place, and time. She exhibits normal muscle tone. Coordination normal.   Skin: Skin is warm, dry and intact. She is not diaphoretic. No pallor.   Psychiatric: She has a normal mood and affect. Her speech is normal and behavior is normal. Judgment and thought content normal. Cognition and memory are normal.   Nursing note and vitals reviewed.      Vents:  Oxygen Concentration (%): 50 (04/27/20 0748)    Lines/Drains/Airways     Peripheral Intravenous Line                 Peripheral IV - Single Lumen 04/25/20 1052 22 G Left Forearm 2 days                Significant Labs:    CBC/Anemia Profile:  No results for input(s): WBC, HGB, HCT, PLT, MCV, RDW, IRON, FERRITIN, RETIC, FOLATE, SAECDCPQ06, OCCULTBLOOD in the last 48 hours.     Chemistries:  No results for input(s): NA, K, CL, CO2, BUN, CREATININE, CALCIUM, ALBUMIN, PROT, BILITOT, ALKPHOS, ALT, AST, GLUCOSE, MG, PHOS in the last 48 hours.    All pertinent labs within the past 24 hours have been reviewed.    Significant Imaging:  I have reviewed all pertinent imaging results/findings within the past 24 hours.    Assessment/Plan:     Respiratory failure  Probably worsening cxr  cause less awake     Acute hypoxemic respiratory failure  No improvement    Pneumonia due to COVID-19 virus  cxr            Lai Wilson MD  Pulmonology  [facility]

## 2020-04-27 NOTE — TELEPHONE ENCOUNTER
----- Message from Nely Ortiz sent at 2020 12:03 PM CDT -----  [facility]  Chanda Kaufman  MRN: [id number]  : 1928  PCP: Lucina Carter  Home Phone      [phone]  Work Phone      Not on file.  Mobile          [phone]      MESSAGE:    is seeing this patient in the hospital. Natalie was calling because there is some confusion with her hospice admit information.      [phone]

## 2020-04-27 NOTE — PLAN OF CARE
Problem: Adult Inpatient Plan of Care  Goal: Plan of Care Review  Outcome: Ongoing, Progressing  Goal: Patient-Specific Goal (Individualization)  Outcome: Ongoing, Progressing  Goal: Absence of Hospital-Acquired Illness or Injury  Outcome: Ongoing, Progressing  Goal: Optimal Comfort and Wellbeing  Outcome: Ongoing, Progressing  Goal: Readiness for Transition of Care  Outcome: Ongoing, Progressing  Goal: Rounds/Family Conference  Outcome: Ongoing, Progressing     Problem: Wound  Goal: Optimal Wound Healing  Outcome: Ongoing, Progressing     Problem: Fall Injury Risk  Goal: Absence of Fall and Fall-Related Injury  Outcome: Ongoing, Progressing     Problem: Skin Injury Risk Increased  Goal: Skin Health and Integrity  Outcome: Ongoing, Progressing     Patient admitted with hospice. Patient recieves PRN ativan and morphine. Patient arouses to voice. Mumbles and cryies incoherently. Unable to access orientation. Grimaces while turning. Does not follow commands. Receives 15L on nonrebreather mask. NRS on telemetry. Telesitter at bedside.

## 2020-04-27 NOTE — TELEPHONE ENCOUNTER
Natalie with [facility] stated she received orders for service. She is a little confused about the orders,and asked if she could speak with you directly.

## 2020-04-27 NOTE — PLAN OF CARE
Ms Hyatt is here on inpatient hospice with [place]. She is here with COVID 19 diagnosis.Her family is at her side.

## 2020-04-27 NOTE — SUBJECTIVE & OBJECTIVE
Interval History: less responsive compared to er    Objective:     Vital Signs (Most Recent):  Temp: 99.3 °F (37.4 °C) (04/27/20 0748)  Pulse: 73 (04/27/20 1008)  Resp: (!) 22 (04/27/20 0748)  BP: (!) 140/90 (04/27/20 0748)  SpO2: (!) 91 % (04/27/20 0748) Vital Signs (24h Range):  Temp:  [98.8 °F (37.1 °C)-99.3 °F (37.4 °C)] 99.3 °F (37.4 °C)  Pulse:  [] 73  Resp:  [18-22] 22  SpO2:  [66 %-91 %] 91 %  BP: (140-147)/(63-90) 140/90     Weight: 60 kg (132 lb 4.4 oz)  Body mass index is 24.99 kg/m².      Intake/Output Summary (Last 24 hours) at 4/27/2020 1228  Last data filed at 4/26/2020 1800  Gross per 24 hour   Intake 0 ml   Output 1 ml   Net -1 ml       Physical Exam   Constitutional: She is oriented to person, place, and time. She appears well-developed and well-nourished. She is cooperative.  Non-toxic appearance. She does not appear ill. No distress.   HENT:   Head: Normocephalic and atraumatic.   Right Ear: Hearing, tympanic membrane, external ear and ear canal normal.   Left Ear: Hearing, tympanic membrane, external ear and ear canal normal.   Nose: Nose normal. No mucosal edema, rhinorrhea or nasal deformity. No epistaxis. Right sinus exhibits no maxillary sinus tenderness and no frontal sinus tenderness. Left sinus exhibits no maxillary sinus tenderness and no frontal sinus tenderness.   Mouth/Throat: Uvula is midline, oropharynx is clear and moist and mucous membranes are normal. No trismus in the jaw. Normal dentition. No uvula swelling. No posterior oropharyngeal erythema.   Eyes: Conjunctivae and lids are normal. No scleral icterus.   Sclera clear bilat   Neck: Trachea normal, full passive range of motion without pain and phonation normal. Neck supple.   Cardiovascular: Normal rate, regular rhythm, normal heart sounds, intact distal pulses and normal pulses.   Pulmonary/Chest: She is in respiratory distress (mild to moderate). She has decreased breath sounds in the right middle field, the right  lower field, the left middle field and the left lower field. She has no wheezes. She has rhonchi in the right middle field and the left middle field. She has rales in the right middle field and the left middle field.   Abdominal: Soft. Normal appearance and bowel sounds are normal. She exhibits no distension. There is no tenderness.   Musculoskeletal: Normal range of motion. She exhibits no edema or deformity.   Neurological: She is alert and oriented to person, place, and time. She exhibits normal muscle tone. Coordination normal.   Skin: Skin is warm, dry and intact. She is not diaphoretic. No pallor.   Psychiatric: She has a normal mood and affect. Her speech is normal and behavior is normal. Judgment and thought content normal. Cognition and memory are normal.   Nursing note and vitals reviewed.      Vents:  Oxygen Concentration (%): 50 (04/27/20 0748)    Lines/Drains/Airways     Peripheral Intravenous Line                 Peripheral IV - Single Lumen 04/25/20 1052 22 G Left Forearm 2 days                Significant Labs:    CBC/Anemia Profile:  No results for input(s): WBC, HGB, HCT, PLT, MCV, RDW, IRON, FERRITIN, RETIC, FOLATE, [id number], OCCULTBLOOD in the last 48 hours.     Chemistries:  No results for input(s): NA, K, CL, CO2, BUN, CREATININE, CALCIUM, ALBUMIN, PROT, BILITOT, ALKPHOS, ALT, AST, GLUCOSE, MG, PHOS in the last 48 hours.    All pertinent labs within the past 24 hours have been reviewed.    Significant Imaging:  I have reviewed all pertinent imaging results/findings within the past 24 hours.

## 2020-04-27 NOTE — PLAN OF CARE
Problem: Adult Inpatient Plan of Care  Goal: Plan of Care Review  Outcome: Ongoing, Progressing     Problem: Wound  Goal: Optimal Wound Healing  Outcome: Ongoing, Progressing     Problem: Fall Injury Risk  Goal: Absence of Fall and Fall-Related Injury  Outcome: Ongoing, Progressing  Pt doing ok. No question/concerns at this time from family . Family  Agrees with poc. No/falls.  Vital signs are stable. pulse ox was 91% this am on ear.  Patient has had no fevers today. She is on hospice care. Ativan and morpine given OTC to make patient comfortable.  Patient has no Appetite. She responses to voice and touch. Tele is SR.

## 2020-04-28 NOTE — PROGRESS NOTES
[facility] Hospital Medicine  Progress Note    Patient Name: Chanda Kaufman  MRN: [id number]  Patient Class: IP- Hospice   Admission Date: 4/24/2020  Length of Stay: 4 days  Attending Physician: Juan Diego Machuca MD  Primary Care Provider: Lucina Carter MD        Subjective:     Principal Problem:<principal problem not specified>        HPI:  No notes on file    Overview/Hospital Course:  89-year-old white female evaluated by me today.  She is lying supine on her hospital bed.  Eyes are closed.  Unresponsive to sternal rub.  Oxygen saturations in the low 80%.  Patient on 100% non-rebreather.  Patient is a DNR on hospice.  Death is near.    Interval History: hypoxic    Review of Systems   Unable to perform ROS: Acuity of condition     Objective:     Vital Signs (Most Recent):  Temp: 96.3 °F (35.7 °C) (04/28/20 0742)  Pulse: 99 (04/28/20 1251)  Resp: 20 (04/28/20 0742)  BP: 132/61 (04/28/20 0742)  SpO2: (!) 94 % (04/28/20 0900) Vital Signs (24h Range):  Temp:  [96 °F (35.6 °C)-97.9 °F (36.6 °C)] 96.3 °F (35.7 °C)  Pulse:  [70-99] 99  Resp:  [16-22] 20  SpO2:  [85 %-94 %] 94 %  BP: (132-156)/(52-94) 132/61     Weight: 60 kg (132 lb 4.4 oz)  Body mass index is 24.99 kg/m².  No intake or output data in the 24 hours ending 04/28/20 1408   Physical Exam   Constitutional:   Elderly female lying in bed supine position.  No movement.   HENT:   Head: Normocephalic and atraumatic.   Cardiovascular: Normal rate, regular rhythm and normal heart sounds. Exam reveals no gallop and no friction rub.   No murmur heard.  Pulmonary/Chest: She is in respiratory distress. She has no wheezes. She has no rales.   Abdominal: Soft.   Neurological: She is unresponsive. GCS eye subscore is 1. GCS verbal subscore is 1. GCS motor subscore is 1.       Significant Labs: BMP: No results for input(s): GLU, NA, K, CL, CO2, BUN, CREATININE, CALCIUM, MG in the last 48 hours.  CBC: No results for input(s): WBC, HGB, HCT, PLT in  the last 48 hours.    Significant Imaging: I have reviewed all pertinent imaging results/findings within the past 24 hours.  I have reviewed and interpreted all pertinent imaging results/findings within the past 24 hours.      Assessment/Plan:      Acute hypoxemic respiratory failure  Comfort care only      Pneumonia due to COVID-19 virus  Continue hospice care  I discontinued oxygen  Comfort care only        VTE Risk Mitigation (From admission, onward)         Ordered     IP VTE HIGH RISK PATIENT  Once      04/24/20 2042     Place sequential compression device  Until discontinued      04/24/20 2042                      Juan Diego Machuca MD  Department of Hospital Medicine   [facility]

## 2020-04-28 NOTE — PLAN OF CARE
20 1523   Final Note   Assessment Type Final Discharge Note   Anticipated Discharge Disposition Hospice   Post-Acute Status   Post-Acute Authorization Other   Other Status See Comments   Discharge Delays None known at this time       Patient  at 1516 per Dr. Lai Wilson while on inpatient hospice with [facility]. [place] notified.     Milagro Salguero LMSW

## 2020-04-28 NOTE — SUBJECTIVE & OBJECTIVE
Interval History: hypoxic    Review of Systems   Unable to perform ROS: Acuity of condition     Objective:     Vital Signs (Most Recent):  Temp: 96.3 °F (35.7 °C) (04/28/20 0742)  Pulse: 99 (04/28/20 1251)  Resp: 20 (04/28/20 0742)  BP: 132/61 (04/28/20 0742)  SpO2: (!) 94 % (04/28/20 0900) Vital Signs (24h Range):  Temp:  [96 °F (35.6 °C)-97.9 °F (36.6 °C)] 96.3 °F (35.7 °C)  Pulse:  [70-99] 99  Resp:  [16-22] 20  SpO2:  [85 %-94 %] 94 %  BP: (132-156)/(52-94) 132/61     Weight: 60 kg (132 lb 4.4 oz)  Body mass index is 24.99 kg/m².  No intake or output data in the 24 hours ending 04/28/20 1408   Physical Exam   Constitutional:   Elderly female lying in bed supine position.  No movement.   HENT:   Head: Normocephalic and atraumatic.   Cardiovascular: Normal rate, regular rhythm and normal heart sounds. Exam reveals no gallop and no friction rub.   No murmur heard.  Pulmonary/Chest: She is in respiratory distress. She has no wheezes. She has no rales.   Abdominal: Soft.   Neurological: She is unresponsive. GCS eye subscore is 1. GCS verbal subscore is 1. GCS motor subscore is 1.       Significant Labs: BMP: No results for input(s): GLU, NA, K, CL, CO2, BUN, CREATININE, CALCIUM, MG in the last 48 hours.  CBC: No results for input(s): WBC, HGB, HCT, PLT in the last 48 hours.    Significant Imaging: I have reviewed all pertinent imaging results/findings within the past 24 hours.  I have reviewed and interpreted all pertinent imaging results/findings within the past 24 hours.

## 2020-04-28 NOTE — PROGRESS NOTES
[facility]  Pulmonology  Progress Note    Patient Name: Chanda Kaufman  MRN: [id number]  Admission Date: 4/24/2020  Hospital Length of Stay: 4 days  Code Status: DNR  Attending Provider: Juan Diego Machuca MD  Primary Care Provider: Lucina Carter MD   Principal Problem: <principal problem not specified>    Subjective:     Interval History: saw patient early this morning she stopped breathing within minutes of this note and was pronounced DEAD at 15:16 NO PULSE NO BREATHING TELEMETRY SUPPORTED ASYSTOLE    Objective:     Vital Signs (Most Recent):  Temp: 96.3 °F (35.7 °C) (04/28/20 0742)  Pulse: (!) 119 (04/28/20 1422)  Resp: 20 (04/28/20 0742)  BP: 132/61 (04/28/20 0742)  SpO2: (!) 94 % (04/28/20 0900) Vital Signs (24h Range):  Temp:  [96 °F (35.6 °C)-97.9 °F (36.6 °C)] 96.3 °F (35.7 °C)  Pulse:  [] 119  Resp:  [16-22] 20  SpO2:  [85 %-94 %] 94 %  BP: (132-156)/(52-94) 132/61     Weight: 60 kg (132 lb 4.4 oz)  Body mass index is 24.99 kg/m².    No intake or output data in the 24 hours ending 04/28/20 1518    Physical Exam    Vents:  Oxygen Concentration (%): 50 (04/28/20 1100)    Lines/Drains/Airways     Peripheral Intravenous Line                 Peripheral IV - Single Lumen 04/25/20 1052 22 G Left Forearm 3 days                Significant Labs:    CBC/Anemia Profile:  No results for input(s): WBC, HGB, HCT, PLT, MCV, RDW, IRON, FERRITIN, RETIC, FOLATE, [id number], OCCULTBLOOD in the last 48 hours.     Chemistries:  No results for input(s): NA, K, CL, CO2, BUN, CREATININE, CALCIUM, ALBUMIN, PROT, BILITOT, ALKPHOS, ALT, AST, GLUCOSE, MG, PHOS in the last 48 hours.    All pertinent labs within the past 24 hours have been reviewed.    Significant Imaging:  I have reviewed all pertinent imaging results/findings within the past 24 hours.    Assessment/Plan:     Acute hypoxemic respiratory failure  No improvement    Pneumonia due to COVID-19 virus  Ms Kaufman was pronounced dead at 15:16  4/28/2020 from the Novel Corona Virus(COVID-19)    Pressure injury, stage 3  a    PAD (peripheral artery disease)  a    History of breast cancer  a    Hypothyroidism due to acquired atrophy of thyroid  a    Essential hypertension  a    Hyperlipidemia  a      Pronounced Dead at 15:16     Lai Wilson MD  Pulmonology  [facility]

## 2020-04-28 NOTE — PROGRESS NOTES
[facility]  Pulmonology  Progress Note    Patient Name: Chanda Kaufman  MRN: [id number]  Admission Date: 4/24/2020  Hospital Length of Stay: 4 days  Code Status: DNR  Attending Provider: Juan Diego Machuca MD  Primary Care Provider: Lucina Carter MD   Principal Problem: <principal problem not specified>    Subjective:     Interval History: cant wake her now prognosis poor pt is dnr    Objective:     Vital Signs (Most Recent):  Temp: 96.3 °F (35.7 °C) (04/28/20 0742)  Pulse: 97 (04/28/20 1035)  Resp: 20 (04/28/20 0742)  BP: 132/61 (04/28/20 0742)  SpO2: (!) 94 % (04/28/20 0900) Vital Signs (24h Range):  Temp:  [96 °F (35.6 °C)-97.9 °F (36.6 °C)] 96.3 °F (35.7 °C)  Pulse:  [70-98] 97  Resp:  [16-22] 20  SpO2:  [85 %-94 %] 94 %  BP: (132-156)/(52-94) 132/61     Weight: 60 kg (132 lb 4.4 oz)  Body mass index is 24.99 kg/m².    No intake or output data in the 24 hours ending 04/28/20 1346    Physical Exam   Constitutional: She is oriented to person, place, and time. She appears well-developed and well-nourished. She is cooperative.  Non-toxic appearance. She does not appear ill. No distress.   HENT:   Head: Normocephalic and atraumatic.   Right Ear: Hearing, tympanic membrane, external ear and ear canal normal.   Left Ear: Hearing, tympanic membrane, external ear and ear canal normal.   Nose: Nose normal. No mucosal edema, rhinorrhea or nasal deformity. No epistaxis. Right sinus exhibits no maxillary sinus tenderness and no frontal sinus tenderness. Left sinus exhibits no maxillary sinus tenderness and no frontal sinus tenderness.   Mouth/Throat: Uvula is midline, oropharynx is clear and moist and mucous membranes are normal. No trismus in the jaw. Normal dentition. No uvula swelling. No posterior oropharyngeal erythema.   Eyes: Conjunctivae and lids are normal. No scleral icterus.   Sclera clear bilat   Neck: Trachea normal, full passive range of motion without pain and phonation normal. Neck  supple. JVD (8 cm above scm) present. No neck rigidity.       Cardiovascular: Normal rate, regular rhythm, normal heart sounds, intact distal pulses and normal pulses.   Pulmonary/Chest: She is in respiratory distress (mild to moderate). She has decreased breath sounds in the right upper field, the right middle field, the right lower field, the left upper field, the left middle field and the left lower field. She has wheezes. She has rhonchi in the right middle field and the left middle field. She has rales in the right upper field, the right middle field, the right lower field, the left upper field, the left middle field and the left lower field.   Abdominal: Soft. Normal appearance and bowel sounds are normal. She exhibits no distension. There is no tenderness.   Musculoskeletal: Normal range of motion. She exhibits no edema or deformity.   Neurological: She is alert and oriented to person, place, and time. She exhibits normal muscle tone. Coordination normal.   Skin: Skin is warm, dry and intact. Capillary refill takes less than 2 seconds. She is not diaphoretic. No pallor.   Psychiatric: She has a normal mood and affect. Her speech is normal and behavior is normal. Judgment and thought content normal. Cognition and memory are normal.   Nursing note and vitals reviewed.      Vents:  Oxygen Concentration (%): 50 (04/28/20 1100)    Lines/Drains/Airways     Peripheral Intravenous Line                 Peripheral IV - Single Lumen 04/25/20 1052 22 G Left Forearm 3 days                Significant Labs:    CBC/Anemia Profile:  No results for input(s): WBC, HGB, HCT, PLT, MCV, RDW, IRON, FERRITIN, RETIC, FOLATE, [id number], OCCULTBLOOD in the last 48 hours.     Chemistries:  No results for input(s): NA, K, CL, CO2, BUN, CREATININE, CALCIUM, ALBUMIN, PROT, BILITOT, ALKPHOS, ALT, AST, GLUCOSE, MG, PHOS in the last 48 hours.    All pertinent labs within the past 24 hours have been reviewed.    Significant Imaging:  I have  reviewed all pertinent imaging results/findings within the past 24 hours.    Assessment/Plan:     Respiratory failure  Probably worsening cxr cause less awake     Acute hypoxemic respiratory failure  No improvement    Pneumonia due to COVID-19 virus  cxr            Lai Wilson MD  Pulmonology  [facility]

## 2020-04-28 NOTE — PLAN OF CARE
04/28/20 1323   Post-Acute Status   Post-Acute Authorization Hospice       Patient here on inpatient hospice. Spoke with hospice representative who states patient can remain inpatient as long as criteria is met. I did inquire what the plan would be if patient does not meet inpatient criteria anymore. Lamar Gay states her SW is contacting family to see about bringing her home, if not patient may have to transfer to a nursing home to continue hospice care. If this is the option patient will need another 142 approval form to enter a nursing home from the state as family did remove her from [facility] prior to admit here. Lamar states Aimee Gay nurse will be by to access patient this afternoon.

## 2020-04-28 NOTE — SUBJECTIVE & OBJECTIVE
Interval History: saw patient early this morning she stopped breathing within minutes of this note and was pronounced DEAD at 15:16 NO PULSE NO BREATHING TELEMETRY SUPPORTED ASYSTOLE    Objective:     Vital Signs (Most Recent):  Temp: 96.3 °F (35.7 °C) (04/28/20 0742)  Pulse: (!) 119 (04/28/20 1422)  Resp: 20 (04/28/20 0742)  BP: 132/61 (04/28/20 0742)  SpO2: (!) 94 % (04/28/20 0900) Vital Signs (24h Range):  Temp:  [96 °F (35.6 °C)-97.9 °F (36.6 °C)] 96.3 °F (35.7 °C)  Pulse:  [] 119  Resp:  [16-22] 20  SpO2:  [85 %-94 %] 94 %  BP: (132-156)/(52-94) 132/61     Weight: 60 kg (132 lb 4.4 oz)  Body mass index is 24.99 kg/m².    No intake or output data in the 24 hours ending 04/28/20 1518    Physical Exam    Vents:  Oxygen Concentration (%): 50 (04/28/20 1100)    Lines/Drains/Airways     Peripheral Intravenous Line                 Peripheral IV - Single Lumen 04/25/20 1052 22 G Left Forearm 3 days                Significant Labs:    CBC/Anemia Profile:  No results for input(s): WBC, HGB, HCT, PLT, MCV, RDW, IRON, FERRITIN, RETIC, FOLATE, [id number], OCCULTBLOOD in the last 48 hours.     Chemistries:  No results for input(s): NA, K, CL, CO2, BUN, CREATININE, CALCIUM, ALBUMIN, PROT, BILITOT, ALKPHOS, ALT, AST, GLUCOSE, MG, PHOS in the last 48 hours.    All pertinent labs within the past 24 hours have been reviewed.    Significant Imaging:  I have reviewed all pertinent imaging results/findings within the past 24 hours.

## 2020-04-28 NOTE — SUBJECTIVE & OBJECTIVE
Interval History: cant wake her now prognosis poor pt is dnr    Objective:     Vital Signs (Most Recent):  Temp: 96.3 °F (35.7 °C) (04/28/20 0742)  Pulse: 97 (04/28/20 1035)  Resp: 20 (04/28/20 0742)  BP: 132/61 (04/28/20 0742)  SpO2: (!) 94 % (04/28/20 0900) Vital Signs (24h Range):  Temp:  [96 °F (35.6 °C)-97.9 °F (36.6 °C)] 96.3 °F (35.7 °C)  Pulse:  [70-98] 97  Resp:  [16-22] 20  SpO2:  [85 %-94 %] 94 %  BP: (132-156)/(52-94) 132/61     Weight: 60 kg (132 lb 4.4 oz)  Body mass index is 24.99 kg/m².    No intake or output data in the 24 hours ending 04/28/20 1346    Physical Exam   Constitutional: She is oriented to person, place, and time. She appears well-developed and well-nourished. She is cooperative.  Non-toxic appearance. She does not appear ill. No distress.   HENT:   Head: Normocephalic and atraumatic.   Right Ear: Hearing, tympanic membrane, external ear and ear canal normal.   Left Ear: Hearing, tympanic membrane, external ear and ear canal normal.   Nose: Nose normal. No mucosal edema, rhinorrhea or nasal deformity. No epistaxis. Right sinus exhibits no maxillary sinus tenderness and no frontal sinus tenderness. Left sinus exhibits no maxillary sinus tenderness and no frontal sinus tenderness.   Mouth/Throat: Uvula is midline, oropharynx is clear and moist and mucous membranes are normal. No trismus in the jaw. Normal dentition. No uvula swelling. No posterior oropharyngeal erythema.   Eyes: Conjunctivae and lids are normal. No scleral icterus.   Sclera clear bilat   Neck: Trachea normal, full passive range of motion without pain and phonation normal. Neck supple. JVD (8 cm above scm) present. No neck rigidity.       Cardiovascular: Normal rate, regular rhythm, normal heart sounds, intact distal pulses and normal pulses.   Pulmonary/Chest: She is in respiratory distress (mild to moderate). She has decreased breath sounds in the right upper field, the right middle field, the right lower field, the left  upper field, the left middle field and the left lower field. She has wheezes. She has rhonchi in the right middle field and the left middle field. She has rales in the right upper field, the right middle field, the right lower field, the left upper field, the left middle field and the left lower field.   Abdominal: Soft. Normal appearance and bowel sounds are normal. She exhibits no distension. There is no tenderness.   Musculoskeletal: Normal range of motion. She exhibits no edema or deformity.   Neurological: She is alert and oriented to person, place, and time. She exhibits normal muscle tone. Coordination normal.   Skin: Skin is warm, dry and intact. Capillary refill takes less than 2 seconds. She is not diaphoretic. No pallor.   Psychiatric: She has a normal mood and affect. Her speech is normal and behavior is normal. Judgment and thought content normal. Cognition and memory are normal.   Nursing note and vitals reviewed.      Vents:  Oxygen Concentration (%): 50 (04/28/20 1100)    Lines/Drains/Airways     Peripheral Intravenous Line                 Peripheral IV - Single Lumen 04/25/20 1052 22 G Left Forearm 3 days                Significant Labs:    CBC/Anemia Profile:  No results for input(s): WBC, HGB, HCT, PLT, MCV, RDW, IRON, FERRITIN, RETIC, FOLATE, [id number], OCCULTBLOOD in the last 48 hours.     Chemistries:  No results for input(s): NA, K, CL, CO2, BUN, CREATININE, CALCIUM, ALBUMIN, PROT, BILITOT, ALKPHOS, ALT, AST, GLUCOSE, MG, PHOS in the last 48 hours.    All pertinent labs within the past 24 hours have been reviewed.    Significant Imaging:  I have reviewed all pertinent imaging results/findings within the past 24 hours.

## 2020-04-28 NOTE — PLAN OF CARE
Patient explained care as performing care:  Monitor Breathing Pattern; Patient oxygen saturation 85% earlier tonight and oxygen was increased to 100% Nonrebreather per Respiratory.  Maintain Pain Regimen; Patient receiving Morphine IV Q 2 hrs.  Monitor for signs of anxiety; Ativan 2mg IV Q 2 hrs tonight. Did help with anxiety this shift.  Administer IV antibiotics as ordered ; Patient on Levofloxacin Q 48 hrs.  Monitor Skin integrity; Reposition patient Q 2 hrs.  Fall Precautions;Maintain Patient Safety; No falls or injkury noted this shift; QAMAR System at bedside.  Monitor Cardiac Rhythm; Patient on Tele in NSR.  Maintain Isolation; Patient on Airborne, Droplet and Contact Isolation.

## 2020-06-19 NOTE — DISCHARGE SUMMARY
[facility] Medicine  Discharge Summary      Patient Name: Chanda Kaufman  MRN: [id number]  Admission Date: 2020  Hospital Length of Stay: 4 days  Discharge Date and Time:      Attending Physician: No att. providers found   Discharging Provider: Juan Diego Machuca MD  Primary Care Provider: Lucina Carter MD      HPI:   No notes on file    * No surgery found *      Hospital Course:   89-year-old white female .  Body released to nursing home     Consults:     Pneumonia due to COVID-19 virus  Patient   Released body to  home        Final Active Diagnoses:    Diagnosis Date Noted POA    Pneumonia due to COVID-19 virus [U07.1, J12.89] 2020 Yes    Acute hypoxemic respiratory failure [J96.01] 2020 Yes    Pressure injury, stage 3 [L89.93] 2019 Yes    PAD (peripheral artery disease) [I73.9] 2019 Yes    History of breast cancer [Z85.3] 2019 Not Applicable    Hypothyroidism due to acquired atrophy of thyroid [E03.4] 2015 Yes    Essential hypertension [I10] 2015 Yes    Hyperlipidemia [E78.5] 01/15/2013 Yes      Problems Resolved During this Admission:       Discharged Condition:     Disposition:     Follow Up:    Patient Instructions:   No discharge procedures on file.    Significant Diagnostic Studies: none    Pending Diagnostic Studies:     None         Medications:  Reconciled Home Medications:      Medication List      ASK your doctor about these medications    acetaminophen 650 MG Tbsr  Commonly known as: TYLENOL  Take 1 tablet (650 mg total) by mouth every 8 (eight) hours.     ALPRAZolam 0.5 MG tablet  Commonly known as: XANAX  Take 1 tablet (0.5 mg total) by mouth 2 (two) times daily as needed. 30 day supply only. Appointment needed. Thanks     ascorbic acid (vitamin C) 500 MG tablet  Commonly known as: VITAMIN C  Take 500 mg by mouth 2 (two) times daily.     calcium-vitamin D3 500 mg(1,250mg) -200  unit per tablet  Commonly known as: OS- + D3  Take 1 tablet by mouth 2 (two) times daily with meals.     CARDIAMIN ORAL  Take 1 tablet by mouth once daily.     ciprofloxacin HCl 500 MG tablet  Commonly known as: CIPRO  Take 1 tablet (500 mg total) by mouth 2 (two) times daily.     docusate sodium 100 MG capsule  Commonly known as: COLACE  Take 1 capsule (100 mg total) by mouth every 12 (twelve) hours.     gabapentin 300 MG capsule  Commonly known as: NEURONTIN  Take 1 capsule (300 mg total) by mouth once daily.     levothyroxine 50 MCG tablet  Commonly known as: SYNTHROID  Take 1 tablet (50 mcg total) by mouth once daily.     LIQUACEL ORAL  Take 30 mLs by mouth 2 (two) times daily.     meclizine 25 mg tablet  Commonly known as: ANTIVERT  Take 25 mg by mouth every evening.     melatonin  Commonly known as: MELATIN  Take 5 mg by mouth every evening.     metoprolol tartrate 100 MG tablet  Commonly known as: LOPRESSOR  TAKE 1 TABLET BY MOUTH TWICE DAILY     simvastatin 40 MG tablet  Commonly known as: ZOCOR  Take 1 tablet (40 mg total) by mouth nightly.     torsemide 5 MG Tab  Commonly known as: DEMADEX  Take 2 tablets (10 mg total) by mouth once daily.            Indwelling Lines/Drains at time of discharge:   Lines/Drains/Airways     None                 Time spent on the discharge of patient: 10 minutes  Patient was seen and examined on the date of discharge and determined to be suitable for discharge.         Juan Diego Machuca MD  Department of Hospital Medicine  [facility]

## 2020-12-07 ENCOUNTER — TELEPHONE (OUTPATIENT)
Dept: FAMILY MEDICINE | Facility: CLINIC | Age: 85
End: 2020-12-07

## 2020-12-07 NOTE — TELEPHONE ENCOUNTER
----- Message from Toby Kinney sent at 2020 11:53 AM CST -----  Contact: Alejandra @ [employer] Almaz Kaufman  MRN: [id number]  : 1928  PCP: Lucina Carter  Home Phone      [phone]  Work Phone      Not on file.  Mobile          [phone]      MESSAGE: [employer] [facility] sent fax last week -- waiting for response -- please check status & fax back -- fax # [phone]    Call Alejandra @ [phone]    PCP: Raul (sent to [facility])

## 2020-12-07 NOTE — TELEPHONE ENCOUNTER
Tahir will check Dr. Montelongo's paperwork when she returns on Tuesday. She brought some home with her over the weekend

## 2023-08-23 NOTE — ED TRIAGE NOTES
PT presents with C/O right hip pain that began today when she fell on her back in the yard 45 minutes ago pt takes Plavix daily, pt denies LOC   Body Location Override (Optional - Billing Will Still Be Based On Selected Body Map Location If Applicable): left index finger

## 2024-10-17 NOTE — ASSESSMENT & PLAN NOTE
Comfort care only    
Continue hospice care  I discontinued oxygen  Comfort care only    
Ms Kaufman was pronounced dead at 15:16 4/28/2020 from the Novel Corona Virus(COVID-19)  
No improvement  
Patient   Released body to  home    
Probably worsening cxr cause less awake   
a  
cxr   
Continue Regimen: .\\n\\n- ammonium lactate 12 % lotion: Apply to body throughout once daily after showers while the skin is still damp. Avoid the face. Pt has at home\\n\\n.
Render In Strict Bullet Format?: No
Detail Level: Zone
Continue Regimen: .\\n\\nSAVE FOR FLARES:\\n-Diprolene (augmented) 0.05 % topical ointment: Apply a thin layer to affected areas on back and knees twice a day for two weeks, then rest for two weeks. Repeat for 3 months as needed for flares. Pt has at home\\n\\n.
Initiate Treatment: .\\n\\nKNEES:\\n-urea 40 % topical cream: Apply a thin layer to affected areas on the knees every night for 3 months.*\\n\\n\\n*If not covered by insurance, recommended to use urea 20% Cream, sold over the counter.\\n\\n.
Samples Given: .\\n\\n-Polysporin ointment\\n\\n.
Initiate Treatment: .\\n\\n-Polysporin ointment: apply a thin layer to the treated lesion on the face twice daily until healed. Samples provided\\n\\n.